# Patient Record
Sex: MALE | Race: ASIAN | URBAN - METROPOLITAN AREA
[De-identification: names, ages, dates, MRNs, and addresses within clinical notes are randomized per-mention and may not be internally consistent; named-entity substitution may affect disease eponyms.]

---

## 2019-04-01 ENCOUNTER — TELEPHONE (OUTPATIENT)
Dept: NEUROLOGY | Facility: CLINIC | Age: 39
End: 2019-04-01

## 2019-04-01 ENCOUNTER — OFFICE VISIT (OUTPATIENT)
Dept: NEUROLOGY | Facility: CLINIC | Age: 39
End: 2019-04-01

## 2019-04-01 VITALS
RESPIRATION RATE: 15 BRPM | BODY MASS INDEX: 21.97 KG/M2 | SYSTOLIC BLOOD PRESSURE: 135 MMHG | WEIGHT: 140 LBS | HEART RATE: 115 BPM | DIASTOLIC BLOOD PRESSURE: 88 MMHG | HEIGHT: 67 IN

## 2019-04-01 DIAGNOSIS — G37.3 TRANSVERSE MYELITIS (HCC): ICD-10-CM

## 2019-04-01 DIAGNOSIS — G35 MULTIPLE SCLEROSIS (HCC): Primary | ICD-10-CM

## 2019-04-01 PROCEDURE — 99203 OFFICE O/P NEW LOW 30 MIN: CPT | Performed by: OTHER

## 2019-04-01 RX ORDER — MECLIZINE HYDROCHLORIDE CHEWABLE TABLETS 25 MG/1
TABLET, CHEWABLE ORAL
Qty: 30 TABLET | Refills: 0 | Status: SHIPPED | OUTPATIENT
Start: 2019-04-01

## 2019-04-01 RX ORDER — BACLOFEN 10 MG/1
10 TABLET ORAL 2 TIMES DAILY
COMMUNITY
End: 2021-06-07

## 2019-04-01 RX ORDER — OXYBUTYNIN CHLORIDE 5 MG/1
5 TABLET ORAL 2 TIMES DAILY
Qty: 60 TABLET | Refills: 3 | Status: SHIPPED | OUTPATIENT
Start: 2019-04-01 | End: 2019-06-18

## 2019-04-01 RX ORDER — GABAPENTIN 300 MG/1
300 CAPSULE ORAL 2 TIMES DAILY
COMMUNITY
End: 2021-06-07

## 2019-04-01 RX ORDER — TADALAFIL 20 MG/1
20 TABLET ORAL
Qty: 4 TABLET | Refills: 0 | Status: SHIPPED | OUTPATIENT
Start: 2019-04-01

## 2019-04-01 NOTE — H&P
[address] [facility] with [facility]  4/1/2019    11:20 AM    Diagnosis of MS      46 RHM diagnosed 2001 with Multiple Sclerosis. He was diagnosed by Dr Bob Shah. LP done.   Steroid given and he did not ge testicles (cpt=76870)      Left testicle         Current Outpatient Medications:   •  gabapentin 300 MG Oral Cap, Take 300 mg by mouth 2 (two) times daily. , Disp: , Rfl:   •  baclofen 10 MG Oral Tab, Take 10 mg by mouth 2 (two) times daily. , Disp: , Rfl: jose alejandro.      IMPRESSION:  Probable demyelinating disease primarily spinal cord. The normal visual evoked potential indicates no evidence of optic nerve dysfunction and therefore is not in keeping with neuromyelitis optica.   He is steroid unresponsive which

## 2019-06-18 ENCOUNTER — OFFICE VISIT (OUTPATIENT)
Dept: NEUROLOGY | Facility: CLINIC | Age: 39
End: 2019-06-18

## 2019-06-18 ENCOUNTER — TELEPHONE (OUTPATIENT)
Dept: NEUROLOGY | Facility: CLINIC | Age: 39
End: 2019-06-18

## 2019-06-18 VITALS
HEART RATE: 99 BPM | SYSTOLIC BLOOD PRESSURE: 121 MMHG | DIASTOLIC BLOOD PRESSURE: 68 MMHG | HEIGHT: 67 IN | RESPIRATION RATE: 18 BRPM | WEIGHT: 150 LBS | BODY MASS INDEX: 23.54 KG/M2

## 2019-06-18 DIAGNOSIS — G35 MULTIPLE SCLEROSIS (HCC): ICD-10-CM

## 2019-06-18 DIAGNOSIS — G37.3 TRANSVERSE MYELITIS (HCC): Primary | ICD-10-CM

## 2019-06-18 PROCEDURE — 99211 OFF/OP EST MAY X REQ PHY/QHP: CPT | Performed by: OTHER

## 2019-06-18 RX ORDER — FLAVOXATE HYDROCHLORIDE 100 MG/1
100 TABLET ORAL 2 TIMES DAILY PRN
Qty: 60 TABLET | Refills: 3 | Status: SHIPPED | OUTPATIENT
Start: 2019-06-18 | End: 2020-02-11

## 2019-06-18 RX ORDER — TRAZODONE HYDROCHLORIDE 50 MG/1
50 TABLET ORAL NIGHTLY
Qty: 30 TABLET | Refills: 3 | Status: SHIPPED | OUTPATIENT
Start: 2019-06-18 | End: 2020-02-11

## 2019-06-18 NOTE — PROGRESS NOTES
[employer] with [address]  2/2/1980  Primary Care Provider:  Jose Baum MD    6/18/2019  Accompanied visit:     (x) No.        46year old yo patient being seen for:  Pa changes  No cervical or upper thoracic cord lesion      Problem/s Identified this visit:   Transverse myelitis ([facility] [address]  (primary encounter diagnosis)  Multiple sclerosis ([facility] [address]    Discussion plus Diagnostics & Treatment Orders:  Orders Placed This Encounter visits were with done with NP, PA or MA/PSRs during the entirety of the visit

## 2019-06-18 NOTE — PROGRESS NOTES
Patient was diagnosed with UTI 3 days after starting new RX prescribed by HE. Patient reports urinating blood, back pain. Went to [facility] ER. CT scan showed kidney normal.   No MS flare since last visit.

## 2019-06-18 NOTE — TELEPHONE ENCOUNTER
[address] [facility]  DOS:  05/14/2019  One disk    Uploaded disk  Returned disk to pt

## 2019-07-02 ENCOUNTER — TELEPHONE (OUTPATIENT)
Dept: NEUROLOGY | Facility: CLINIC | Age: 39
End: 2019-07-02

## 2019-07-02 ENCOUNTER — PATIENT MESSAGE (OUTPATIENT)
Dept: NEUROLOGY | Facility: CLINIC | Age: 39
End: 2019-07-02

## 2019-07-02 NOTE — TELEPHONE ENCOUNTER
Pt needs codes for labs ordered. Pt does not have insurance so calling various labs for costs. Call pt.

## 2019-07-02 NOTE — TELEPHONE ENCOUNTER
Called patient with CPT codes. Will Mychart codes to patient.     TANK direct Reflex to 9 ENAS  CPT:  04556    Rheumatoid Arthritis factor  CPT: 04548    Edilma Greenwood  CPT:  55181

## 2019-07-03 NOTE — PROGRESS NOTES
Patient since starting the trazodone has been having headaches and dizziness the following morning. Instructed him to discontinue the trazodone and see if headaches/dizziness improve. If it does not improve he was instructed to call back and let us know.  H

## 2019-07-03 NOTE — TELEPHONE ENCOUNTER
From: Jamaal Cowan RN  To: Gary Merino  Sent: 7/2/2019 11:02 AM CDT  Subject: codes    Good morning [facility],    Here are the codes you requested:    TANK direct Reflex to 9 ENAS  CPT: 00601    Rheumatoid Arthritis factor  CPT: 76426    Sed Rate, Jethro

## 2019-07-15 LAB
ANA SCREEN, IFA: NEGATIVE
RHEUMATOID FACTOR: <14 IU/ML
SED RATE BY MODIFIED$WESTERGREN: 2 MM/H

## 2019-09-05 ENCOUNTER — TELEPHONE (OUTPATIENT)
Dept: NEUROLOGY | Facility: CLINIC | Age: 39
End: 2019-09-05

## 2020-02-11 ENCOUNTER — OFFICE VISIT (OUTPATIENT)
Dept: NEUROLOGY | Facility: CLINIC | Age: 40
End: 2020-02-11
Payer: COMMERCIAL

## 2020-02-11 VITALS
DIASTOLIC BLOOD PRESSURE: 78 MMHG | RESPIRATION RATE: 16 BRPM | SYSTOLIC BLOOD PRESSURE: 119 MMHG | HEART RATE: 100 BPM | WEIGHT: 150 LBS | HEIGHT: 67 IN | BODY MASS INDEX: 23.54 KG/M2

## 2020-02-11 DIAGNOSIS — G37.3 TRANSVERSE MYELITIS (HCC): Primary | ICD-10-CM

## 2020-02-11 DIAGNOSIS — G35 MULTIPLE SCLEROSIS (HCC): ICD-10-CM

## 2020-02-11 DIAGNOSIS — R29.898 BILATERAL LEG WEAKNESS: ICD-10-CM

## 2020-02-11 DIAGNOSIS — R15.9 INCONTINENCE OF FECES, UNSPECIFIED FECAL INCONTINENCE TYPE: ICD-10-CM

## 2020-02-11 DIAGNOSIS — N39.42 URINARY INCONTINENCE WITHOUT SENSORY AWARENESS: ICD-10-CM

## 2020-02-11 DIAGNOSIS — R20.0 BILATERAL LEG NUMBNESS: ICD-10-CM

## 2020-02-11 PROCEDURE — 99213 OFFICE O/P EST LOW 20 MIN: CPT | Performed by: OTHER

## 2020-02-11 NOTE — PROGRESS NOTES
[facility] with [address]  2/2/1980  Primary Care Provider:  Elby Brittle, MD    2/11/2020  Accompanied visit:     (x) No.        45year old yo patient being seen for:  Vishal palpated    NEURO  Lower extremity shows hyperreflexia with increased tone. Virtually no movement noted. He is essentially almost totally numb from the waist down with the left leg being numb more than the right.   He cannot detect temperature and pin sen multiple sclerosis and only then can we consider putting him on disease modifying drugs. At this point treatment is mostly symptomatic. Bowel program recommended along with bladder retraining.     Referral to [facility] Therapy given   [facility]

## 2020-08-24 ENCOUNTER — OFFICE VISIT (OUTPATIENT)
Dept: NEUROLOGY | Facility: CLINIC | Age: 40
End: 2020-08-24
Payer: COMMERCIAL

## 2020-08-24 VITALS
HEIGHT: 67 IN | RESPIRATION RATE: 16 BRPM | HEART RATE: 105 BPM | SYSTOLIC BLOOD PRESSURE: 135 MMHG | DIASTOLIC BLOOD PRESSURE: 78 MMHG | BODY MASS INDEX: 23.54 KG/M2 | TEMPERATURE: 98 F | WEIGHT: 150 LBS

## 2020-08-24 DIAGNOSIS — N39.42 URINARY INCONTINENCE WITHOUT SENSORY AWARENESS: ICD-10-CM

## 2020-08-24 DIAGNOSIS — R29.898 BILATERAL LEG WEAKNESS: ICD-10-CM

## 2020-08-24 DIAGNOSIS — G37.3 TRANSVERSE MYELITIS (HCC): Primary | ICD-10-CM

## 2020-08-24 DIAGNOSIS — R20.0 BILATERAL LEG NUMBNESS: ICD-10-CM

## 2020-08-24 PROCEDURE — 3078F DIAST BP <80 MM HG: CPT | Performed by: OTHER

## 2020-08-24 PROCEDURE — 3008F BODY MASS INDEX DOCD: CPT | Performed by: OTHER

## 2020-08-24 PROCEDURE — 99213 OFFICE O/P EST LOW 20 MIN: CPT | Performed by: OTHER

## 2020-08-24 PROCEDURE — 3075F SYST BP GE 130 - 139MM HG: CPT | Performed by: OTHER

## 2020-08-24 NOTE — PROGRESS NOTES
[facility] with [address]  2/2/1980  Primary Care Provider:  Jeni Taylor MD    8/24/2020  Accompanied visit:      (x) No.        45year old yo patient being seen for:  T encounter diagnosis)  Bilateral leg numbness  Bilateral leg weakness  Urinary incontinence without sensory awareness    Discussion plus Diagnostics & Treatment Orders:  Orders Placed This Encounter      DME - External      [facility] ([facility]

## 2021-03-31 ENCOUNTER — PATIENT MESSAGE (OUTPATIENT)
Dept: NEUROLOGY | Facility: CLINIC | Age: 41
End: 2021-03-31

## 2021-03-31 NOTE — TELEPHONE ENCOUNTER
Replied via [address] with COVID-19 vaccination smart phrase and added that we have NKA listed and asked patient to please update us if there are allergies unknown to this office.

## 2021-03-31 NOTE — TELEPHONE ENCOUNTER
From: Kris Aguilar  To:  Anya De Jesus MD  Sent: 3/31/2021 3:24 PM CDT  Subject: Other    Hi Dr Celina Escudero,     Is the covid vaccine(pfizer) safe for me to get considering my health condition and what are the chances of getting complications or jose

## 2021-04-12 ENCOUNTER — TELEPHONE (OUTPATIENT)
Dept: NEUROLOGY | Facility: CLINIC | Age: 41
End: 2021-04-12

## 2021-04-12 DIAGNOSIS — G37.3 TRANSVERSE MYELITIS (HCC): Primary | ICD-10-CM

## 2021-04-12 DIAGNOSIS — R20.0 BILATERAL LEG NUMBNESS: ICD-10-CM

## 2021-04-12 DIAGNOSIS — G35 MULTIPLE SCLEROSIS (HCC): ICD-10-CM

## 2021-04-12 DIAGNOSIS — N39.42 URINARY INCONTINENCE WITHOUT SENSORY AWARENESS: ICD-10-CM

## 2021-04-12 DIAGNOSIS — R29.898 BILATERAL LEG WEAKNESS: ICD-10-CM

## 2021-04-12 NOTE — TELEPHONE ENCOUNTER
Patient called, he is requesting a referral for [facility] physical therapy in [place]. Dr. Celina Escudero wrote a referral for patient last year but he did not go due to covid. Please advise if we can create new physical therapy referral for patient.

## 2021-04-13 NOTE — TELEPHONE ENCOUNTER
Called and spoke with patient to clarify point of service. Patient would like referral to [facility] therapy in [facility] on [address]. New order placed as requested. Has appointment today.

## 2021-04-14 ENCOUNTER — TELEPHONE (OUTPATIENT)
Dept: NEUROLOGY | Facility: CLINIC | Age: 41
End: 2021-04-14

## 2021-04-14 NOTE — TELEPHONE ENCOUNTER
Physical therapy evaluation and plan of care received from  therapy for physician review and signature. Patient receives PT for signs and symptoms of abnormal gait, ataxia, difficulty walking/abnormality of gait and multiple sclerosis.     Frequency: 2

## 2021-05-20 ENCOUNTER — TELEPHONE (OUTPATIENT)
Dept: NEUROLOGY | Facility: CLINIC | Age: 41
End: 2021-05-20

## 2021-05-20 DIAGNOSIS — G35 MULTIPLE SCLEROSIS (HCC): Primary | ICD-10-CM

## 2021-05-20 NOTE — TELEPHONE ENCOUNTER
Pt needs new orders for brain and thoracic MRIs at [facility].   [facility] informed pt they cannot use the orders from last August.

## 2021-06-07 ENCOUNTER — OFFICE VISIT (OUTPATIENT)
Dept: NEUROLOGY | Facility: CLINIC | Age: 41
End: 2021-06-07
Payer: COMMERCIAL

## 2021-06-07 ENCOUNTER — TELEPHONE (OUTPATIENT)
Dept: NEUROLOGY | Facility: CLINIC | Age: 41
End: 2021-06-07

## 2021-06-07 VITALS
WEIGHT: 150 LBS | SYSTOLIC BLOOD PRESSURE: 120 MMHG | DIASTOLIC BLOOD PRESSURE: 70 MMHG | BODY MASS INDEX: 23.54 KG/M2 | RESPIRATION RATE: 16 BRPM | HEART RATE: 74 BPM | HEIGHT: 67 IN

## 2021-06-07 DIAGNOSIS — G35 MULTIPLE SCLEROSIS (HCC): Primary | ICD-10-CM

## 2021-06-07 DIAGNOSIS — G43.009 MIGRAINE WITHOUT AURA AND WITHOUT STATUS MIGRAINOSUS, NOT INTRACTABLE: ICD-10-CM

## 2021-06-07 DIAGNOSIS — G37.3 TRANSVERSE MYELITIS (HCC): ICD-10-CM

## 2021-06-07 DIAGNOSIS — R29.898 BILATERAL LEG WEAKNESS: ICD-10-CM

## 2021-06-07 DIAGNOSIS — R20.0 BILATERAL LEG NUMBNESS: ICD-10-CM

## 2021-06-07 PROCEDURE — 3074F SYST BP LT 130 MM HG: CPT | Performed by: OTHER

## 2021-06-07 PROCEDURE — 3078F DIAST BP <80 MM HG: CPT | Performed by: OTHER

## 2021-06-07 PROCEDURE — 99214 OFFICE O/P EST MOD 30 MIN: CPT | Performed by: OTHER

## 2021-06-07 PROCEDURE — 3008F BODY MASS INDEX DOCD: CPT | Performed by: OTHER

## 2021-06-07 RX ORDER — TOPIRAMATE 25 MG/1
25 TABLET ORAL 2 TIMES DAILY
Qty: 60 TABLET | Refills: 5 | Status: SHIPPED | OUTPATIENT
Start: 2021-06-07 | End: 2022-01-26

## 2021-06-07 RX ORDER — CEPHALEXIN 500 MG/1
500 CAPSULE ORAL 2 TIMES DAILY
COMMUNITY
End: 2021-06-08 | Stop reason: ALTCHOICE

## 2021-06-07 RX ORDER — GABAPENTIN 300 MG/1
300 CAPSULE ORAL 2 TIMES DAILY
Qty: 180 CAPSULE | Refills: 3 | Status: SHIPPED | OUTPATIENT
Start: 2021-06-07

## 2021-06-07 RX ORDER — PREDNISONE 20 MG/1
TABLET ORAL
Qty: 30 TABLET | Refills: 0 | Status: SHIPPED | OUTPATIENT
Start: 2021-06-12 | End: 2021-06-27

## 2021-06-07 RX ORDER — METHYLPREDNISOLONE SODIUM SUCCINATE 500 MG/1
1000 INJECTION, POWDER, FOR SOLUTION INTRAMUSCULAR; INTRAVENOUS DAILY
Status: DISCONTINUED | OUTPATIENT
Start: 2021-06-07 | End: 2021-06-07

## 2021-06-07 RX ORDER — PREDNISONE 20 MG/1
TABLET ORAL
Qty: 30 TABLET | Refills: 0 | Status: SHIPPED | OUTPATIENT
Start: 2021-06-07 | End: 2021-06-08

## 2021-06-07 RX ORDER — METHYLPREDNISOLONE SODIUM SUCCINATE 500 MG/1
500 INJECTION, POWDER, FOR SOLUTION INTRAMUSCULAR; INTRAVENOUS DAILY
Status: COMPLETED | OUTPATIENT
Start: 2021-06-10 | End: 2021-06-11

## 2021-06-07 RX ORDER — SUMATRIPTAN 100 MG/1
100 TABLET, FILM COATED ORAL EVERY 2 HOUR PRN
Qty: 9 TABLET | Refills: 5 | Status: SHIPPED | OUTPATIENT
Start: 2021-06-07 | End: 2021-11-22

## 2021-06-07 RX ORDER — BACLOFEN 10 MG/1
10 TABLET ORAL 2 TIMES DAILY
Qty: 180 TABLET | Refills: 3 | Status: SHIPPED | OUTPATIENT
Start: 2021-06-07

## 2021-06-07 RX ORDER — METHYLPREDNISOLONE SODIUM SUCCINATE 500 MG/1
1000 INJECTION, POWDER, FOR SOLUTION INTRAMUSCULAR; INTRAVENOUS DAILY
Status: COMPLETED | OUTPATIENT
Start: 2021-06-07 | End: 2021-06-09

## 2021-06-07 RX ORDER — METHYLPREDNISOLONE SODIUM SUCCINATE 500 MG/1
500 INJECTION, POWDER, FOR SOLUTION INTRAMUSCULAR; INTRAVENOUS DAILY
Status: DISCONTINUED | OUTPATIENT
Start: 2021-06-09 | End: 2021-06-07

## 2021-06-07 RX ORDER — PREDNISONE 20 MG/1
TABLET ORAL
Qty: 30 TABLET | Refills: 0 | Status: SHIPPED | OUTPATIENT
Start: 2021-06-11 | End: 2021-06-07

## 2021-06-07 RX ADMIN — METHYLPREDNISOLONE SODIUM SUCCINATE 1000 MG: 500 INJECTION, POWDER, FOR SOLUTION INTRAMUSCULAR; INTRAVENOUS at 09:12:00

## 2021-06-07 NOTE — TELEPHONE ENCOUNTER
Called [facility] [phone] and was transferred to [facility]. I spoke with Elder Mates. Codes [id number] and [id number] are valid and billable and no authorization is required.  She indicated that medical records might be needed and also that this is not a [facility]

## 2021-06-07 NOTE — PROGRESS NOTES
IV started per Clemente JACINTO RN and Solu-Medrol infused over 20 minutes without difficulty. IV discontinued per RN. Patient tolerated infusion well with no complications.

## 2021-06-07 NOTE — PROGRESS NOTES
[facility] with [address]  2/2/1980  Primary Care Provider:  Rachana Carlisle MD    6/7/2021  Accompanied visit:      (x) No.      37year old yo patient being seen for:  MS a (100 mg total) by mouth every 2 (two) hours as needed for Migraine. , Disp: 9 tablet, Rfl: 5  •  Meclizine HCl 25 MG Oral Chew Tab, 1/2 tab BID prn for dizziness, Disp: 30 tablet, Rfl: 0  •  Tadalafil (CIALIS) 20 MG Oral Tab, Take 1 tablet (20 mg total) by by mouth 2 (two) times daily. Dispense:  180 capsule          Refill:  3      baclofen 10 MG Oral Tab          Sig: Take 1 tablet (10 mg total) by mouth 2 (two) times daily.           Dispense:  180 tablet          Refill:  3      predniSONE 20 MG patient was escorted out and handed-off discharge process and instructions to the check out desk.   No additional issues relevant to visit were raised to staff at this time interval.        This document is to be interpreted as my current opinion regarding

## 2021-06-07 NOTE — TELEPHONE ENCOUNTER
PA started for Solumedrol infusion 1 gm x 3 days and 500 mg x 2 days in [place] office. Orders placed. Infusion started today with OV.     Routed to PA Team.

## 2021-06-08 ENCOUNTER — NURSE ONLY (OUTPATIENT)
Dept: NEUROLOGY | Facility: CLINIC | Age: 41
End: 2021-06-08
Payer: COMMERCIAL

## 2021-06-08 VITALS — DIASTOLIC BLOOD PRESSURE: 72 MMHG | SYSTOLIC BLOOD PRESSURE: 120 MMHG | RESPIRATION RATE: 18 BRPM | HEART RATE: 120 BPM

## 2021-06-08 DIAGNOSIS — G35 MULTIPLE SCLEROSIS (HCC): Primary | ICD-10-CM

## 2021-06-08 PROCEDURE — 3074F SYST BP LT 130 MM HG: CPT | Performed by: OTHER

## 2021-06-08 PROCEDURE — 96365 THER/PROPH/DIAG IV INF INIT: CPT | Performed by: OTHER

## 2021-06-08 PROCEDURE — 3078F DIAST BP <80 MM HG: CPT | Performed by: OTHER

## 2021-06-08 RX ADMIN — METHYLPREDNISOLONE SODIUM SUCCINATE 1000 MG: 500 INJECTION, POWDER, FOR SOLUTION INTRAMUSCULAR; INTRAVENOUS at 11:01:00

## 2021-06-08 NOTE — PROGRESS NOTES
IV started per [facility] RN into right antecubital site and Solu-Medrol infused over 20 minutes without difficulty. IV discontinued per RN. Patient tolerated infusion well with no complications.

## 2021-06-08 NOTE — TELEPHONE ENCOUNTER
During patient's solumedrol infusion today, patient requested a medication to aid in his sleep. RN spoke to the provider and he ordered Lunesta 2mg HS PRN 20 pills. RN entered ordered medication for the patient.

## 2021-06-08 NOTE — TELEPHONE ENCOUNTER
Patient is to receive only 5 days of solumedrol. 1000 mg for 3 days, 500 mg for 2 days. Steroid taper to follow.

## 2021-06-09 ENCOUNTER — NURSE ONLY (OUTPATIENT)
Dept: NEUROLOGY | Facility: CLINIC | Age: 41
End: 2021-06-09
Payer: COMMERCIAL

## 2021-06-09 VITALS
HEIGHT: 67 IN | WEIGHT: 150 LBS | OXYGEN SATURATION: 99 % | RESPIRATION RATE: 16 BRPM | BODY MASS INDEX: 23.54 KG/M2 | DIASTOLIC BLOOD PRESSURE: 78 MMHG | SYSTOLIC BLOOD PRESSURE: 136 MMHG | HEART RATE: 101 BPM

## 2021-06-09 DIAGNOSIS — G37.3 TRANSVERSE MYELITIS (HCC): ICD-10-CM

## 2021-06-09 PROCEDURE — 3078F DIAST BP <80 MM HG: CPT | Performed by: OTHER

## 2021-06-09 PROCEDURE — 3075F SYST BP GE 130 - 139MM HG: CPT | Performed by: OTHER

## 2021-06-09 PROCEDURE — 3008F BODY MASS INDEX DOCD: CPT | Performed by: OTHER

## 2021-06-09 RX ADMIN — METHYLPREDNISOLONE SODIUM SUCCINATE 1000 MG: 500 INJECTION, POWDER, FOR SOLUTION INTRAMUSCULAR; INTRAVENOUS at 10:24:00

## 2021-06-09 NOTE — PROGRESS NOTES
IV started per Oriana Weinberg RN and Solu-Medrol infused over 20 minutes without difficulty. IV discontinued per RN. Patient tolerated infusion well with no complications.       Patient made comment that he had increase stiffness with walking yesterday after infus

## 2021-06-10 ENCOUNTER — NURSE ONLY (OUTPATIENT)
Dept: NEUROLOGY | Facility: CLINIC | Age: 41
End: 2021-06-10
Payer: COMMERCIAL

## 2021-06-10 ENCOUNTER — TELEPHONE (OUTPATIENT)
Dept: NEUROLOGY | Facility: CLINIC | Age: 41
End: 2021-06-10

## 2021-06-10 VITALS — DIASTOLIC BLOOD PRESSURE: 64 MMHG | SYSTOLIC BLOOD PRESSURE: 122 MMHG | HEART RATE: 70 BPM

## 2021-06-10 DIAGNOSIS — G35 MULTIPLE SCLEROSIS (HCC): ICD-10-CM

## 2021-06-10 DIAGNOSIS — G35 MULTIPLE SCLEROSIS (HCC): Primary | ICD-10-CM

## 2021-06-10 PROCEDURE — 3074F SYST BP LT 130 MM HG: CPT | Performed by: OTHER

## 2021-06-10 PROCEDURE — 96365 THER/PROPH/DIAG IV INF INIT: CPT | Performed by: OTHER

## 2021-06-10 PROCEDURE — 3078F DIAST BP <80 MM HG: CPT | Performed by: OTHER

## 2021-06-10 RX ORDER — FAMOTIDINE 20 MG/1
20 TABLET ORAL 2 TIMES DAILY
Qty: 60 TABLET | Refills: 0 | Status: SHIPPED | OUTPATIENT
Start: 2021-06-10 | End: 2021-11-22

## 2021-06-10 RX ORDER — ETOH/EUC OIL/MENTH/PEP/WINTERG
SPRAY, NON-AEROSOL (ML) MUCOUS MEMBRANE
Qty: 20 EACH | Refills: 2 | Status: SHIPPED
Start: 2021-06-10

## 2021-06-10 RX ORDER — DRAINAGE BAG
EACH MISCELLANEOUS
Qty: 4 EACH | Refills: 5 | Status: SHIPPED
Start: 2021-06-10

## 2021-06-10 RX ORDER — DIAPER,BRIEF,ADULT, DISPOSABLE
EACH MISCELLANEOUS
Qty: 25 EACH | Refills: 2 | Status: SHIPPED
Start: 2021-06-10

## 2021-06-10 RX ADMIN — METHYLPREDNISOLONE SODIUM SUCCINATE 500 MG: 500 INJECTION, POWDER, FOR SOLUTION INTRAMUSCULAR; INTRAVENOUS at 10:29:00

## 2021-06-10 NOTE — PROGRESS NOTES
IV started per Christian Burton RN into left antecubital site and Solu-Medrol infused over 20 minutes without difficulty. IV discontinued per RN. Patient tolerated infusion well with no complications.

## 2021-06-10 NOTE — TELEPHONE ENCOUNTER
Order [facility]abil[facility]. Hilaria Gallardo also given to patient to present at next PT visit.     Patient to present to office for final solumedrol infusion tomorrow at 9 AM. Appointment scheduled as requeste

## 2021-06-11 ENCOUNTER — NURSE ONLY (OUTPATIENT)
Dept: NEUROLOGY | Facility: CLINIC | Age: 41
End: 2021-06-11
Payer: COMMERCIAL

## 2021-06-11 VITALS
OXYGEN SATURATION: 99 % | RESPIRATION RATE: 18 BRPM | HEIGHT: 67 IN | SYSTOLIC BLOOD PRESSURE: 120 MMHG | BODY MASS INDEX: 23.54 KG/M2 | WEIGHT: 150 LBS | DIASTOLIC BLOOD PRESSURE: 68 MMHG | HEART RATE: 104 BPM

## 2021-06-11 PROCEDURE — 96365 THER/PROPH/DIAG IV INF INIT: CPT | Performed by: OTHER

## 2021-06-11 PROCEDURE — 3074F SYST BP LT 130 MM HG: CPT | Performed by: OTHER

## 2021-06-11 PROCEDURE — 3008F BODY MASS INDEX DOCD: CPT | Performed by: OTHER

## 2021-06-11 PROCEDURE — 3078F DIAST BP <80 MM HG: CPT | Performed by: OTHER

## 2021-06-11 RX ADMIN — METHYLPREDNISOLONE SODIUM SUCCINATE 500 MG: 500 INJECTION, POWDER, FOR SOLUTION INTRAMUSCULAR; INTRAVENOUS at 09:49:00

## 2021-06-11 NOTE — PROGRESS NOTES
IV started per José Miguel Cordova RN and Solu-Medrol infused over 20 minutes without difficulty. IV discontinued per RN. Patient tolerated infusion well with no complications.       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

## 2021-06-23 ENCOUNTER — TELEPHONE (OUTPATIENT)
Dept: NEUROLOGY | Facility: CLINIC | Age: 41
End: 2021-06-23

## 2021-06-23 NOTE — TELEPHONE ENCOUNTER
Progress note dated 6/10/21 from [facility] PT.   Plan of Treatment is 2-3x a week for 8 weeks to address poin, joint mobility, posture deficits, gait ability, transfer ability, strength deficits, ROM, improper body mechanics, balance defecits, bed mobility, stair

## 2021-06-23 NOTE — TELEPHONE ENCOUNTER
Received [facility] Therapy progress note for signature and plan of treatment    Placed in nurses bin for completion.

## 2021-07-07 ENCOUNTER — TELEPHONE (OUTPATIENT)
Dept: NEUROLOGY | Facility: CLINIC | Age: 41
End: 2021-07-07

## 2021-07-07 NOTE — TELEPHONE ENCOUNTER
Received order for Ankle Food Orthosis Dynamic from [facility], as physical therapist has recommended. Order signed by Dr Geovanni Vigil. Faxed to [phone] with confirmation fax.

## 2021-07-16 ENCOUNTER — TELEPHONE (OUTPATIENT)
Dept: NEUROLOGY | Facility: CLINIC | Age: 41
End: 2021-07-16

## 2021-07-16 NOTE — TELEPHONE ENCOUNTER
Pt has a high deductible so he is working with Lasha Ryan for assistance with ankle brace and motorized scooter. Requesting copy of ankle brace and motorized scooter orders to [email or web address]. He also requested proof of MS diagnosis.   Suggested h

## 2021-07-26 ENCOUNTER — OFFICE VISIT (OUTPATIENT)
Dept: NEUROLOGY | Facility: CLINIC | Age: 41
End: 2021-07-26
Payer: COMMERCIAL

## 2021-07-26 VITALS — SYSTOLIC BLOOD PRESSURE: 124 MMHG | RESPIRATION RATE: 16 BRPM | DIASTOLIC BLOOD PRESSURE: 70 MMHG | HEART RATE: 100 BPM

## 2021-07-26 DIAGNOSIS — G35 MS (MULTIPLE SCLEROSIS) (HCC): Primary | ICD-10-CM

## 2021-07-26 DIAGNOSIS — G43.009 MIGRAINE WITHOUT AURA AND WITHOUT STATUS MIGRAINOSUS, NOT INTRACTABLE: ICD-10-CM

## 2021-07-26 PROCEDURE — 99214 OFFICE O/P EST MOD 30 MIN: CPT | Performed by: PHYSICIAN ASSISTANT

## 2021-07-26 PROCEDURE — 3074F SYST BP LT 130 MM HG: CPT | Performed by: PHYSICIAN ASSISTANT

## 2021-07-26 PROCEDURE — 3078F DIAST BP <80 MM HG: CPT | Performed by: PHYSICIAN ASSISTANT

## 2021-07-26 NOTE — PROGRESS NOTES
[facility] with [address]  2/2/1980  Primary Care Provider:  Jessika Loredo MD    7/26/2021  Accompanied visit: No  Previous visit and existing record notes reviewed in prep present on prior examination while remaining 2 others, one in the right frontal subcortical white matter and another in the right centrum semiovale were not clearly present on prior examination which may be due to differences in technique.  No these enhance BRIEFS MEDIUM) Does not apply Misc, Please provide briefs and under pads, Disp: 25 each, Rfl: 2  •  Incontinence Supply Disposable (UNDERPADS EXTRA LARGE) Does not apply Misc, Please provide incontinence supplies, Disp: 20 each, Rfl: 2  •  gabapentin 300 M DMTs at length with patient. Patient given information on zeposia and tysabri. We also discussed Aubagio. JCV test ordered. Regarding the zeposia recommended he discuss with the ophthalmologist. He expressed full understanding. Patient will call back with a

## 2021-09-16 ENCOUNTER — TELEPHONE (OUTPATIENT)
Dept: NEUROLOGY | Facility: CLINIC | Age: 41
End: 2021-09-16

## 2021-09-16 NOTE — TELEPHONE ENCOUNTER
Fax received from [facility] for provider review and signature. Patient is being discharged from PT. Patient received therapy for signs/symptoms consistent with abnormal gait, ataxia and difficulty walking.     Patient has reached maximum benefit from

## 2021-11-22 ENCOUNTER — TELEPHONE (OUTPATIENT)
Dept: NEUROLOGY | Facility: CLINIC | Age: 41
End: 2021-11-22

## 2021-11-22 ENCOUNTER — OFFICE VISIT (OUTPATIENT)
Dept: NEUROLOGY | Facility: CLINIC | Age: 41
End: 2021-11-22
Payer: COMMERCIAL

## 2021-11-22 VITALS
SYSTOLIC BLOOD PRESSURE: 130 MMHG | BODY MASS INDEX: 23 KG/M2 | HEIGHT: 67 IN | DIASTOLIC BLOOD PRESSURE: 77 MMHG | HEART RATE: 103 BPM

## 2021-11-22 DIAGNOSIS — G35 MS (MULTIPLE SCLEROSIS) (HCC): Primary | ICD-10-CM

## 2021-11-22 DIAGNOSIS — G43.009 MIGRAINE WITHOUT AURA AND WITHOUT STATUS MIGRAINOSUS, NOT INTRACTABLE: ICD-10-CM

## 2021-11-22 PROCEDURE — 3008F BODY MASS INDEX DOCD: CPT | Performed by: PHYSICIAN ASSISTANT

## 2021-11-22 PROCEDURE — 3075F SYST BP GE 130 - 139MM HG: CPT | Performed by: PHYSICIAN ASSISTANT

## 2021-11-22 PROCEDURE — 99214 OFFICE O/P EST MOD 30 MIN: CPT | Performed by: PHYSICIAN ASSISTANT

## 2021-11-22 PROCEDURE — 3078F DIAST BP <80 MM HG: CPT | Performed by: PHYSICIAN ASSISTANT

## 2021-11-22 RX ORDER — ELETRIPTAN HYDROBROMIDE 40 MG/1
TABLET, FILM COATED ORAL
Qty: 8 TABLET | Refills: 2 | Status: SHIPPED | OUTPATIENT
Start: 2021-11-22

## 2021-11-22 NOTE — PROGRESS NOTES
[facility] with [address]  2/2/1980  Primary Care Provider:  Mara Topete MD    11/22/2021  Accompanied visit: No  Previous visit and existing record notes reviewed in pre were not clearly present on prior examination which may be due to differences in technique. No these enhances on the postcontrast sequences. These are nonspecific.      No abnormal signal intensity foci in the corpus callosum, brainstem, cerebral peduncle, each, Rfl: 2  •  gabapentin 300 MG Oral Cap, Take 1 capsule (300 mg total) by mouth 2 (two) times daily. , Disp: 180 capsule, Rfl: 3  •  baclofen 10 MG Oral Tab, Take 1 tablet (10 mg total) by mouth 2 (two) times daily. , Disp: 180 tablet, Rfl: 3  •  topiram effects of any treatment relevant to above. Includes explanation of tests as necessary. Return in about 4 months (around 3/22/2022).       Patient understands that if needed, based on condition and or test results, follow up will be readjusted    Dior Selby

## 2021-11-22 NOTE — TELEPHONE ENCOUNTER
During office visit on 11/22/21 completed Disability ID Card and Parking Placard Renewal Forms. Gave pt original copy.   Copy to scanning

## 2021-12-13 ENCOUNTER — TELEPHONE (OUTPATIENT)
Dept: NEUROLOGY | Facility: CLINIC | Age: 41
End: 2021-12-13

## 2021-12-13 NOTE — TELEPHONE ENCOUNTER
Left voice-mail message for patient to call back to discuss negative jcv test and consider proceeding with Tysabri.

## 2021-12-17 NOTE — TELEPHONE ENCOUNTER
Pt returned call, provider not in the office today. Relayed Bessy's message to pt. He is requesting she call him on Monday to discuss Tysabri.

## 2021-12-20 NOTE — TELEPHONE ENCOUNTER
Pt concerned what his financial responsibility will be for tysabri infusions. His ins plan charged him a $75 copay for solumedrol treatments because they were done in the office. Advised once we obtain PA for tysabri, we will determine facility options for him. The MS Touch program can assist in determining pt's out-of-pocket responsibility.

## 2021-12-20 NOTE — TELEPHONE ENCOUNTER
RN called the patient and informed him we have the signed form from a previous visit. RN informed the patient we would be working on the PA and inform him when we had a decision. RN confirmed the patient will have the same insurance in 2022.

## 2021-12-21 NOTE — TELEPHONE ENCOUNTER
Start up paperwork for [place] initiated. Patient to infuse at [facility]. Will fax to [employer] when completed by provider. Patient is JCV negative.

## 2021-12-22 NOTE — TELEPHONE ENCOUNTER
Start up paperwork signed by provider. Faxed to [employer] to enroll in the REMS program. Paperwork faxed to [facility] infusion [place] for referral for infusions. Authorization pending with insurance.

## 2022-01-26 RX ORDER — TOPIRAMATE 25 MG/1
TABLET ORAL
Qty: 60 TABLET | Refills: 5 | Status: SHIPPED | OUTPATIENT
Start: 2022-01-26

## 2022-01-26 NOTE — TELEPHONE ENCOUNTER
Medication: Topiramate  25 mg     Date of last refill: 06/07/21 with 5 addt refills  [place]      Last office visit: 11/22/21  Due back to clinic per last office note:  RTN in 4 months  Date next office visit scheduled:

## 2022-03-21 ENCOUNTER — TELEPHONE (OUTPATIENT)
Dept: NEUROLOGY | Facility: CLINIC | Age: 42
End: 2022-03-21

## 2022-03-21 ENCOUNTER — OFFICE VISIT (OUTPATIENT)
Dept: NEUROLOGY | Facility: CLINIC | Age: 42
End: 2022-03-21
Payer: COMMERCIAL

## 2022-03-21 VITALS
HEART RATE: 88 BPM | RESPIRATION RATE: 16 BRPM | BODY MASS INDEX: 23 KG/M2 | HEIGHT: 67 IN | SYSTOLIC BLOOD PRESSURE: 130 MMHG | DIASTOLIC BLOOD PRESSURE: 78 MMHG

## 2022-03-21 DIAGNOSIS — G43.009 MIGRAINE WITHOUT AURA AND WITHOUT STATUS MIGRAINOSUS, NOT INTRACTABLE: ICD-10-CM

## 2022-03-21 DIAGNOSIS — G37.3 TRANSVERSE MYELITIS (HCC): ICD-10-CM

## 2022-03-21 DIAGNOSIS — G35 MS (MULTIPLE SCLEROSIS) (HCC): Primary | ICD-10-CM

## 2022-03-21 PROCEDURE — 99214 OFFICE O/P EST MOD 30 MIN: CPT | Performed by: PHYSICIAN ASSISTANT

## 2022-03-21 PROCEDURE — 3008F BODY MASS INDEX DOCD: CPT | Performed by: PHYSICIAN ASSISTANT

## 2022-03-21 PROCEDURE — 3075F SYST BP GE 130 - 139MM HG: CPT | Performed by: PHYSICIAN ASSISTANT

## 2022-03-21 PROCEDURE — 3078F DIAST BP <80 MM HG: CPT | Performed by: PHYSICIAN ASSISTANT

## 2022-03-21 NOTE — TELEPHONE ENCOUNTER
Patient initiated conversation at checkout, expressed hesitation regarding starting new medication, is unsure due to imaging results and is worried about side effects. Encouraged patient to digest OV conversation and read AVS and call office with further questions. Upon helping patient through exit door, patient expressed interest in getting second opinion, likes and respects this provider, but is hesitant. Informed that provider does want patient to feel comfortable about treatment plan and does encourage his patient's to advocate for themselves. Informed patient that this RN would follow up referral information. Informed patient of referral order. Patient states his biggest concern is the atypical presentation of MS and whether that is what this is, so would like a second opinion. Orders sent to patient via [address]   Requested that patient contact this office with any questions or should he change his mind about LP

## 2022-03-21 NOTE — TELEPHONE ENCOUNTER
Patient seen in office today. Is hesitant to start Tysabri at this time. Aubagio was discussed, and patient signed [employer] Form. Start form completed. Spoke with Shelli at [facility] and initiated PA over the phone. Received APPROVAL for Aubagio 14mg tab. Effective from 3/21/22 through 3/21/23. Reference #: [id number]    Paperwork to be held pending patient's decision to start medication vs. Seek second opinion.

## 2022-04-23 LAB
ABSOLUTE BASOPHILS: 48 CELLS/UL (ref 0–200)
ABSOLUTE EOSINOPHILS: 150 CELLS/UL (ref 15–500)
ABSOLUTE LYMPHOCYTES: 1126 CELLS/UL (ref 850–3900)
ABSOLUTE MONOCYTES: 290 CELLS/UL (ref 200–950)
ABSOLUTE NEUTROPHILS: 2785 CELLS/UL (ref 1500–7800)
ALBUMIN/GLOBULIN RATIO: 1.5 (CALC) (ref 1–2.5)
ALBUMIN: 4.3 G/DL (ref 3.6–5.1)
ALKALINE PHOSPHATASE: 113 U/L (ref 36–130)
ALT: 68 U/L (ref 9–46)
AST: 20 U/L (ref 10–40)
BASOPHILS: 1.1 %
BILIRUBIN, TOTAL: 0.5 MG/DL (ref 0.2–1.2)
BUN: 15 MG/DL (ref 7–25)
CALCIUM: 9.5 MG/DL (ref 8.6–10.3)
CARBON DIOXIDE: 26 MMOL/L (ref 20–32)
CHLORIDE: 107 MMOL/L (ref 98–110)
CREATININE: 0.85 MG/DL (ref 0.6–1.35)
EGFR IF AFRICN AM: 125 ML/MIN/1.73M2
EGFR IF NONAFRICN AM: 108 ML/MIN/1.73M2
EOSINOPHILS: 3.4 %
GLOBULIN: 2.8 G/DL (CALC) (ref 1.9–3.7)
GLUCOSE: 83 MG/DL (ref 65–99)
HEMATOCRIT: 44.5 % (ref 38.5–50)
HEMOGLOBIN: 13.8 G/DL (ref 13.2–17.1)
LYMPHOCYTES: 25.6 %
MCH: 21 PG (ref 27–33)
MCHC: 31 G/DL (ref 32–36)
MCV: 67.7 FL (ref 80–100)
MITOGEN-NIL: >10 IU/ML
MONOCYTES: 6.6 %
MPV: 10.2 FL (ref 7.5–12.5)
NEUTROPHILS: 63.3 %
NIL: 0.21 IU/ML
PLATELET COUNT: 336 THOUSAND/UL (ref 140–400)
POTASSIUM: 3.9 MMOL/L (ref 3.5–5.3)
PROTEIN, TOTAL: 7.1 G/DL (ref 6.1–8.1)
QUANTIFERON(R)-TB GOLD PLUS, 1 TUBE: POSITIVE
RDW: 17.3 % (ref 11–15)
RED BLOOD CELL COUNT: 6.57 MILLION/UL (ref 4.2–5.8)
SODIUM: 141 MMOL/L (ref 135–146)
TB1-NIL: 1.83 IU/ML
TB2-NIL: 2.1 IU/ML
WHITE BLOOD CELL COUNT: 4.4 THOUSAND/UL (ref 3.8–10.8)

## 2022-04-25 ENCOUNTER — TELEPHONE (OUTPATIENT)
Dept: NEUROLOGY | Facility: CLINIC | Age: 42
End: 2022-04-25

## 2022-04-26 ENCOUNTER — TELEPHONE (OUTPATIENT)
Dept: NEUROLOGY | Facility: CLINIC | Age: 42
End: 2022-04-26

## 2022-05-04 ENCOUNTER — TELEPHONE (OUTPATIENT)
Dept: NEUROLOGY | Facility: CLINIC | Age: 42
End: 2022-05-04

## 2022-05-04 NOTE — TELEPHONE ENCOUNTER
Patient calling, advised that he got negative results on chest x-ray at [facility] in the mail. We are waiting for results to be sent by fax. Confirmed fax number. Patient is calling to verify if he still needs treatment given the negative x-ray. Please advise when records are received.

## 2022-05-09 NOTE — TELEPHONE ENCOUNTER
Left voice-mail message for patient informing him that chest x-ray was negative and we can proceed with Aubagio. Patient instructed to call back with any questions.

## 2022-06-01 LAB
ALBUMIN/GLOBULIN RATIO: 1.6 (CALC) (ref 1–2.5)
ALBUMIN: 4.6 G/DL (ref 3.6–5.1)
ALKALINE PHOSPHATASE: 59 U/L (ref 36–130)
ALT: 14 U/L (ref 9–46)
AST: 15 U/L (ref 10–40)
BILIRUBIN, TOTAL: 0.8 MG/DL (ref 0.2–1.2)
BUN: 16 MG/DL (ref 7–25)
CALCIUM: 9.6 MG/DL (ref 8.6–10.3)
CARBON DIOXIDE: 28 MMOL/L (ref 20–32)
CHLORIDE: 106 MMOL/L (ref 98–110)
CREATININE: 1.08 MG/DL (ref 0.6–1.35)
EGFR IF AFRICN AM: 98 ML/MIN/1.73M2
EGFR IF NONAFRICN AM: 85 ML/MIN/1.73M2
GLOBULIN: 2.8 G/DL (CALC) (ref 1.9–3.7)
GLUCOSE: 83 MG/DL (ref 65–99)
POTASSIUM: 4.1 MMOL/L (ref 3.5–5.3)
PROTEIN, TOTAL: 7.4 G/DL (ref 6.1–8.1)
SODIUM: 141 MMOL/L (ref 135–146)

## 2022-06-29 RX ORDER — GABAPENTIN 300 MG/1
300 CAPSULE ORAL 2 TIMES DAILY
Qty: 180 CAPSULE | Refills: 1 | Status: SHIPPED | OUTPATIENT
Start: 2022-06-29

## 2022-06-29 RX ORDER — GABAPENTIN 300 MG/1
CAPSULE ORAL
Qty: 180 CAPSULE | Refills: 0 | OUTPATIENT
Start: 2022-06-29

## 2022-06-29 RX ORDER — BACLOFEN 10 MG/1
10 TABLET ORAL 2 TIMES DAILY
Qty: 180 TABLET | Refills: 1 | Status: SHIPPED | OUTPATIENT
Start: 2022-06-29

## 2022-06-29 RX ORDER — BACLOFEN 10 MG/1
TABLET ORAL
Qty: 180 TABLET | Refills: 0 | OUTPATIENT
Start: 2022-06-29

## 2022-07-06 NOTE — TELEPHONE ENCOUNTER
Start up paperwork faxed to [employer] to initiate treatment and enroll patient for Aukamleshgio. Receipt confirmation received. Copy sent to scanning. Labs and prior authorization have been obtained.

## 2022-08-01 ENCOUNTER — TELEPHONE (OUTPATIENT)
Dept: NEUROLOGY | Facility: CLINIC | Age: 42
End: 2022-08-01

## 2022-08-01 NOTE — TELEPHONE ENCOUNTER
Informed patient that he may take OTC medication for migraine, but may want to avoid medications that can be hard on the liver such as TYLENOL. Patient informed he may also speak to pharmacy regarding medication contraindications and advice. Also asking if he may get COVID-19 booster vaccination while on AUBAGIO and told yes, we encourage the vaccinations and no issue while on AUBAGIO. Verbalized understanding.

## 2022-08-03 RX ORDER — TOPIRAMATE 25 MG/1
TABLET ORAL
Qty: 60 TABLET | Refills: 5 | Status: SHIPPED | OUTPATIENT
Start: 2022-08-03

## 2022-08-10 ENCOUNTER — TELEPHONE (OUTPATIENT)
Dept: NEUROLOGY | Facility: CLINIC | Age: 42
End: 2022-08-10

## 2022-08-10 NOTE — TELEPHONE ENCOUNTER
After speaking with representative Subha from [facility] who said that patient may call [employer], that there are other resources he may qualify for. Verbalized understanding.

## 2022-08-10 NOTE — TELEPHONE ENCOUNTER
Informed patient that a call has been placed to our drug representative to see if patient might quality for co-pay assistance and that this office will contact him with any further information. Verbalized understanding.

## 2022-08-10 NOTE — TELEPHONE ENCOUNTER
Pt stated he started aubagio and it cost $8000 for the month. Pt concerned with costs and asking for any assistance to reduce cost.s    Call pt to advise.

## 2022-09-12 DIAGNOSIS — G35 MS (MULTIPLE SCLEROSIS) (HCC): Primary | ICD-10-CM

## 2022-09-12 NOTE — TELEPHONE ENCOUNTER
RN received paperwork from [place] [id number] [employer] requesting paper hand signed script for 6 months. Faxed to [place] [address] [employer] at [phone].

## 2022-09-28 ENCOUNTER — TELEPHONE (OUTPATIENT)
Dept: NEUROLOGY | Facility: CLINIC | Age: 42
End: 2022-09-28

## 2022-09-28 NOTE — TELEPHONE ENCOUNTER
S incontinent of bowel and bladder       B Started AUBAGIO 7/22, symptoms started one month later      A Is wearing depends day and night, is distressed and feels he cannot leave the house. Has urgency of bowel and bladder, even after elimination. Has no control. Has numbness of perineum. Denies other symptoms of flare. Denies other new meds or med changes. R Routed to provider.

## 2022-10-04 NOTE — TELEPHONE ENCOUNTER
RN called the patient to assess his current situation. Patient has been numb from the waist down for the last 6-7 weeks specifically in the perineum area, Intermittent numb and tingling in left fingers. Patient has increased stress. Patient believes it is side effect of Aubagio. RN explained it might be more likely to MS Flare or disease progression. RN advised we would forward the call to Dr. Hunter Armstrong with high priority.

## 2022-10-12 NOTE — TELEPHONE ENCOUNTER
Tried calling again -- unsuccessful   Please call the patient and agree it is not [place] but perhaps MS flare and recommend Solumedrol

## 2022-10-13 NOTE — TELEPHONE ENCOUNTER
RN called and LM for the patient with the above message. Advised we can set up Solumedrol. RN called the patient and advised of the above. 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 RN informed provider. Per provider we can offer IVIG or PLEX.

## 2022-10-14 NOTE — TELEPHONE ENCOUNTER
Patient states that for the last 2 months he has been having lack of sensation with bowl movements and feelings that he has not completely evacuated. No diarrhea. They are normal bowel movements. Spoke to patient and he is agreeable to doing the IVIG. Recommended he also consult GI. He expressed full understanding.       Please start the process for the IVIG

## 2022-10-17 NOTE — TELEPHONE ENCOUNTER
Patient returned call, would like to infuse @ [facility] in [place]. [facility] IVIG orders printed to complete. Packet placed in nurses bin.

## 2022-10-18 NOTE — TELEPHONE ENCOUNTER
Discussed with provider. Patient to receive 5 consecutive days of IVIG for treatment of this flare. Referral order placed and faxed to [facility] infusion [place] with receipt confirmation. Orders faxed to [phone]. Paperwork placed in nurses bin awaiting authorization from infusion center.

## 2022-10-24 ENCOUNTER — TELEPHONE (OUTPATIENT)
Dept: NEUROLOGY | Facility: CLINIC | Age: 42
End: 2022-10-24

## 2022-10-24 ENCOUNTER — OFFICE VISIT (OUTPATIENT)
Dept: NEUROLOGY | Facility: CLINIC | Age: 42
End: 2022-10-24
Payer: COMMERCIAL

## 2022-10-24 VITALS
RESPIRATION RATE: 16 BRPM | HEIGHT: 67 IN | SYSTOLIC BLOOD PRESSURE: 119 MMHG | WEIGHT: 140 LBS | DIASTOLIC BLOOD PRESSURE: 75 MMHG | BODY MASS INDEX: 21.97 KG/M2 | HEART RATE: 90 BPM

## 2022-10-24 DIAGNOSIS — R20.0 PERINEAL NUMBNESS: ICD-10-CM

## 2022-10-24 DIAGNOSIS — R19.4 FREQUENT BOWEL MOVEMENTS: ICD-10-CM

## 2022-10-24 DIAGNOSIS — G35 MS (MULTIPLE SCLEROSIS) (HCC): Primary | ICD-10-CM

## 2022-10-24 PROCEDURE — 3008F BODY MASS INDEX DOCD: CPT | Performed by: PHYSICIAN ASSISTANT

## 2022-10-24 PROCEDURE — 3074F SYST BP LT 130 MM HG: CPT | Performed by: PHYSICIAN ASSISTANT

## 2022-10-24 PROCEDURE — 3078F DIAST BP <80 MM HG: CPT | Performed by: PHYSICIAN ASSISTANT

## 2022-10-24 PROCEDURE — 99214 OFFICE O/P EST MOD 30 MIN: CPT | Performed by: PHYSICIAN ASSISTANT

## 2022-10-24 RX ORDER — RENAGEL 800 MG/1
1 TABLET ORAL DAILY
COMMUNITY

## 2022-10-24 NOTE — TELEPHONE ENCOUNTER
Spoke with Vasile Post @ [facility] who confirmed orders received on 10/19/2022. They provided PA information as requested by insurance company. Insurance company to do a courtesy review. Spoke with Phil Rico @ [facility] who will follow up with insurance company regarding IVIG authorization. Patient informed, provided with contact information for Phil Rico @ [facility] ([phone]).

## 2022-10-30 LAB
ALBUMIN/GLOBULIN RATIO: 1.4 (CALC) (ref 1–2.5)
ALBUMIN: 4.3 G/DL (ref 3.6–5.1)
ALKALINE PHOSPHATASE: 68 U/L (ref 36–130)
ALT: 27 U/L (ref 9–46)
AST: 20 U/L (ref 10–40)
BILIRUBIN, TOTAL: 0.7 MG/DL (ref 0.2–1.2)
BUN: 15 MG/DL (ref 7–25)
CALCIUM: 9.4 MG/DL (ref 8.6–10.3)
CARBON DIOXIDE: 28 MMOL/L (ref 20–32)
CHLORIDE: 107 MMOL/L (ref 98–110)
CREATININE: 1.01 MG/DL (ref 0.6–1.29)
EGFR: 96 ML/MIN/1.73M2
GLOBULIN: 3.1 G/DL (CALC) (ref 1.9–3.7)
GLUCOSE: 88 MG/DL (ref 65–99)
POTASSIUM: 3.8 MMOL/L (ref 3.5–5.3)
PROTEIN, TOTAL: 7.4 G/DL (ref 6.1–8.1)
SODIUM: 142 MMOL/L (ref 135–146)

## 2022-10-31 ENCOUNTER — TELEPHONE (OUTPATIENT)
Dept: NEUROLOGY | Facility: CLINIC | Age: 42
End: 2022-10-31

## 2022-10-31 NOTE — TELEPHONE ENCOUNTER
Per patient he is having less issues with accidents going to the bathroom. Patient would like to discuss Eliminate the Aubagio from his system. RN advised she would send this to the provider for recommendation. Per patient he called [facility] but has not heard back. RN will call [facility] for an update.

## 2022-11-02 ENCOUNTER — TELEPHONE (OUTPATIENT)
Dept: NEUROLOGY | Facility: CLINIC | Age: 42
End: 2022-11-02

## 2022-11-02 PROBLEM — H35.719 CENTRAL SEROUS CHORIORETINOPATHY: Status: ACTIVE | Noted: 2022-11-02

## 2022-11-02 PROBLEM — H35.719 CENTRAL SEROUS RETINOPATHY: Status: ACTIVE | Noted: 2022-11-02

## 2022-11-02 NOTE — TELEPHONE ENCOUNTER
Received phone call from [facility] requesting assistance obtaining authorization for patient use of IVIG for treatment of MS Flare. Patient is unable to use steroids d/t the medical condition: central serous retinopathy    Requested letter from our office stating the above and if patient has tried and failed interferon therapy. Letter drafted and faxed to [facility] @ [phone] with receipt confirmation, attn: Fran Bullock authorization.

## 2022-11-03 ENCOUNTER — PATIENT MESSAGE (OUTPATIENT)
Dept: NEUROLOGY | Facility: CLINIC | Age: 42
End: 2022-11-03

## 2022-11-04 NOTE — TELEPHONE ENCOUNTER
From: ERICK Brody  To: Rigobertodasia Parsons  Sent: 11/3/2022 4:26 PM CDT  Subject: CBC      Yash,     The only cbc I was able to find at [address] [place] [address] was done April 2022. There was an order to do one in October 2022 from me. I was not able to find results for that one. Did you have it drawn? If you have a copy of the results, please send us a copy.       Thank you,    SVEN BrodyC

## 2022-11-14 NOTE — TELEPHONE ENCOUNTER
RN spoke to Bernardo MARTÍNEZ at [employer] and was informed they are to call [facility] back on Nov. 16th. Per [facility] this is a courtesy review. They confirmed they received the paperwork on Nov. 2nd and have 7-10 days to review the case. Argelia Valladares Cell [phone].

## 2022-11-20 ENCOUNTER — TELEPHONE (OUTPATIENT)
Dept: NEUROLOGY | Facility: CLINIC | Age: 42
End: 2022-11-20

## 2022-11-20 NOTE — TELEPHONE ENCOUNTER
Spoke to patient on 11/17/22 and recommended he seek evaluation in the ER for MS flare-up to be admitted for treatment. He expressed full understanding.

## 2022-11-23 NOTE — TELEPHONE ENCOUNTER
Fax received from [facility] that patient use of Gammagard treatment has been approved. They will reach out to patient to schedule the infusions.

## 2022-11-30 ENCOUNTER — TELEPHONE (OUTPATIENT)
Dept: NEUROLOGY | Facility: CLINIC | Age: 42
End: 2022-11-30

## 2022-11-30 NOTE — TELEPHONE ENCOUNTER
Per Dr Marie Weller, patient may receive Saline Bolus and to slow down infusion. Caty at [facility] verbalized understanding and will proceed.

## 2022-11-30 NOTE — TELEPHONE ENCOUNTER
Caty with [facility] calling -- advised that patient is having a headache while receiving IVIG. Wants to know if we can give patient fluid saline bolus? Please advise.

## 2022-12-01 ENCOUNTER — TELEPHONE (OUTPATIENT)
Dept: NEUROLOGY | Facility: CLINIC | Age: 42
End: 2022-12-01

## 2022-12-01 NOTE — TELEPHONE ENCOUNTER
Received call from Lien at [facility] requesting pre-medication orders prior to patient IVIG infusion. Patient with continued complaints of headache, refusing to take tylenol at this time. Discussed with provider and infusion center, will send over orders for pre-medications. Acetaminophen 650 mg orally  Benadryl 25 mg- 50 mg orally  IV Bolus of 500cc of NS    Orders signed and faxed with receipt confirmation.

## 2022-12-01 NOTE — TELEPHONE ENCOUNTER
Received a call from cinthya from [employer]. Notes that he is now having muscle and joint pain, feeling as if he may have had a fever last night, but current temp is 98.2. Currently on day 4/5 of infusion. Please advise.

## 2022-12-05 NOTE — TELEPHONE ENCOUNTER
Acute Migraine assessment:    Is this your typical migraine? Describe any change in character from past migraines. Yes:     Location of Pain (select all that apply):  temple and top of head  # Days pain has been present:  5    Describe the pain:  Throbbing  Intensity of the headaches:    With medication: MODERATE   Without medication MODERATE  Associated Symptoms (check all that apply):  Nausea/Upset Stomach, Sensitivity to light, Sensitivity to sound, Sensitivity to smell, Dizziness, Lightheadedness, Runny or stuffy nose, Difficulty concentrating and Mood changes/irritability   Non-pharmaceutical interventions tried and outcome:   [x] Lying down / sleeping:  WORSE  [x] Being in a dark quiet room:  NO CHANGE  [] Massage your head: not tried  [] Cold pack on your head/neck:  not tried  [] Hot pack on your head/neck:  not tried  [] Other:     Abortive Medications tried:  EXCEDRIN (helped little)  Current preventative medication list:  TOPIRAMATE  Any missed doses? No  Any other relevant information:  Patient has not picked up ELETRIPTAN Rx (11/22), as he has not had many migraines with TOPIRAMATE. Adivsed to  and try ELETRIPTAN and get back to us as to whether it helped. Any medications contraindicated? Tried and failed? No      Willing to try Medrol Dosepak? No  Last taken:  States he cannot tolerate   Willing to try Toradol/Decadron injections? No  Last taken:  States he cannot tolerate steroids  Advised patient to seek immediate medical treatment in ED for any concerning symptoms or changes to condition.

## 2022-12-06 ENCOUNTER — TELEPHONE (OUTPATIENT)
Dept: NEUROLOGY | Facility: CLINIC | Age: 42
End: 2022-12-06

## 2022-12-06 NOTE — TELEPHONE ENCOUNTER
Patient infusion records received from [facility] for his daily IVIG infusions. Patient infused 27 grams of Gammagard from 11/28/2022 - 12/2/2022. Patient without premedications the first 3 days. Received call from [facility] [place] [address] requesting orders for premedications. Patient premedicated the final 2 days with:    IV fluids of 500 .9ns  Benadryl 25 mg orally  Tylenol 650 mg orally    Please see attached infusion records for details of infusions and Lot numbers, expiration dates. Paperwork to scan.

## 2022-12-07 NOTE — TELEPHONE ENCOUNTER
Spoke to patient and picked up the relpax yesterday but has not yet tried it. He will try it with next headache and told him ok to combine it with naproxen. If no better then can consider adjusting the topamax for headache prevention. Patient states there has been no change in the character of the migraines. There has just been an increase in the frequency of his migraines. He expressed full understanding.

## 2022-12-21 RX ORDER — BACLOFEN 10 MG/1
TABLET ORAL
Qty: 180 TABLET | Refills: 1 | Status: SHIPPED | OUTPATIENT
Start: 2022-12-21

## 2022-12-21 RX ORDER — GABAPENTIN 300 MG/1
CAPSULE ORAL
Qty: 180 CAPSULE | Refills: 1 | Status: SHIPPED | OUTPATIENT
Start: 2022-12-21

## 2023-01-04 ENCOUNTER — OFFICE VISIT (OUTPATIENT)
Dept: NEUROLOGY | Facility: CLINIC | Age: 43
End: 2023-01-04
Payer: COMMERCIAL

## 2023-01-04 VITALS
WEIGHT: 140 LBS | RESPIRATION RATE: 18 BRPM | HEIGHT: 67 IN | OXYGEN SATURATION: 97 % | BODY MASS INDEX: 21.97 KG/M2 | SYSTOLIC BLOOD PRESSURE: 141 MMHG | HEART RATE: 109 BPM | DIASTOLIC BLOOD PRESSURE: 79 MMHG

## 2023-01-04 DIAGNOSIS — G35 MS (MULTIPLE SCLEROSIS) (HCC): Primary | ICD-10-CM

## 2023-01-04 PROCEDURE — 3078F DIAST BP <80 MM HG: CPT | Performed by: OTHER

## 2023-01-04 PROCEDURE — 99214 OFFICE O/P EST MOD 30 MIN: CPT | Performed by: OTHER

## 2023-01-04 PROCEDURE — 3008F BODY MASS INDEX DOCD: CPT | Performed by: OTHER

## 2023-01-04 PROCEDURE — 3077F SYST BP >= 140 MM HG: CPT | Performed by: OTHER

## 2023-01-05 ENCOUNTER — TELEPHONE (OUTPATIENT)
Dept: NEUROLOGY | Facility: CLINIC | Age: 43
End: 2023-01-05

## 2023-01-05 NOTE — TELEPHONE ENCOUNTER
Patient presented to office for follow up MS appointment. Patient to initiate treatment with Ocrevus infusions. Patient wishes to infuse at a [employer]. Start up paperwork initiated for patient. Labs entered in [facility]. Will fax information to [facility] to enroll in program. Will initiate referral to [facility] once labs have been obtained.

## 2023-01-24 RX ORDER — TOPIRAMATE 25 MG/1
TABLET ORAL
Qty: 180 TABLET | Refills: 1 | Status: SHIPPED | OUTPATIENT
Start: 2023-01-24

## 2023-04-04 ENCOUNTER — TELEPHONE (OUTPATIENT)
Dept: NEUROLOGY | Facility: CLINIC | Age: 43
End: 2023-04-04

## 2023-04-04 ENCOUNTER — OFFICE VISIT (OUTPATIENT)
Dept: NEUROLOGY | Facility: CLINIC | Age: 43
End: 2023-04-04
Payer: COMMERCIAL

## 2023-04-04 VITALS
HEIGHT: 67 IN | HEART RATE: 97 BPM | BODY MASS INDEX: 21.97 KG/M2 | RESPIRATION RATE: 16 BRPM | DIASTOLIC BLOOD PRESSURE: 75 MMHG | WEIGHT: 140 LBS | SYSTOLIC BLOOD PRESSURE: 116 MMHG

## 2023-04-04 DIAGNOSIS — G35 MS (MULTIPLE SCLEROSIS) (HCC): Primary | ICD-10-CM

## 2023-04-04 DIAGNOSIS — G35 MS (MULTIPLE SCLEROSIS) (HCC): ICD-10-CM

## 2023-04-04 DIAGNOSIS — G37.3 TRANSVERSE MYELITIS (HCC): Primary | ICD-10-CM

## 2023-04-04 PROCEDURE — 99214 OFFICE O/P EST MOD 30 MIN: CPT | Performed by: OTHER

## 2023-04-04 PROCEDURE — 3074F SYST BP LT 130 MM HG: CPT | Performed by: OTHER

## 2023-04-04 PROCEDURE — 3078F DIAST BP <80 MM HG: CPT | Performed by: OTHER

## 2023-04-04 PROCEDURE — 3008F BODY MASS INDEX DOCD: CPT | Performed by: OTHER

## 2023-04-07 ENCOUNTER — TELEPHONE (OUTPATIENT)
Dept: NEUROLOGY | Facility: CLINIC | Age: 43
End: 2023-04-07

## 2023-04-07 NOTE — TELEPHONE ENCOUNTER
Received eye exam consult notes from [facility] Dr. Keesha Conti DOS:  03/28/23    Placed copy in nurses [facility] for review.

## 2023-04-11 LAB
AQP4 RECEPTOR IGG SERPL QL: NEGATIVE
HEPATITIS B SURFACE$ANTIBODY QL: REACTIVE
IMMUNOGLOBULIN G: 1351 MG/DL (ref 600–1640)
IMMUNOGLOBULIN M: 83 MG/DL (ref 50–300)
MOG AB CBA, SERUM: NEGATIVE

## 2023-04-20 RX ORDER — OFATUMUMAB 20 MG/.4ML
20 INJECTION, SOLUTION SUBCUTANEOUS
Qty: 1 EACH | Refills: 11 | Status: SHIPPED | OUTPATIENT
Start: 2023-04-20

## 2023-04-20 NOTE — TELEPHONE ENCOUNTER
At last office visit patient decided to initiate treatment with [facility]. Labs available for patient and results have been evaluated and discussed with provider. Start up form faxed to [facility] with receipt confirmation. Will attempt prior authorization for medication.

## 2023-04-25 RX ORDER — OFATUMUMAB 20 MG/.4ML
20 INJECTION, SOLUTION SUBCUTANEOUS WEEKLY
Qty: 3 EACH | Refills: 0 | Status: SHIPPED | OUTPATIENT
Start: 2023-04-25

## 2023-04-25 NOTE — TELEPHONE ENCOUNTER
Prior authorization received for Kesimpta: Authorized from April 20, 2023 to April 20, 2024    Starter dosing sent to pharmacy. Start up paperwork to scanning.

## 2023-04-28 NOTE — TELEPHONE ENCOUNTER
Fax received from [email or web address]. Patient received supplemental injection training for new medication on 4/26/2023. Paperwork to scan.

## 2023-05-05 ENCOUNTER — TELEPHONE (OUTPATIENT)
Dept: NEUROLOGY | Facility: CLINIC | Age: 43
End: 2023-05-05

## 2023-05-05 NOTE — TELEPHONE ENCOUNTER
Wait to start kesimpta till cold symptoms are resolved.  He should get his covid booster and other vaccines he needs prior to starting the kesimpta

## 2023-05-05 NOTE — TELEPHONE ENCOUNTER
Patient has had a runny nose/cough for the last 3-4 days. Patient has not tested for covid yet. Patient is due for his next Covid booster and wants to know if he should get the booster prior to starting the Sidumula 30. Patient wants to know if he can start the Sidumula 30 with his current cold symptoms. RN advised we would pass the message along to the provider and get back to him. RN informed patient typically the recommendation is to get the booster and wait 2 weeks and then start the Sidumula 30. RN advised she will confirm and ask about the cold symptoms. Please advise.

## 2023-05-05 NOTE — TELEPHONE ENCOUNTER
RN called the patient and advised to wait until he is feeling 100% better, get his Covid vaccine, wait 2 weeks and then he can start his Kesimpta. Patient verbalized understanding and did not have any further questions.

## 2023-06-06 ENCOUNTER — TELEPHONE (OUTPATIENT)
Dept: NEUROLOGY | Facility: CLINIC | Age: 43
End: 2023-06-06

## 2023-06-06 DIAGNOSIS — G35 MS (MULTIPLE SCLEROSIS) (HCC): ICD-10-CM

## 2023-06-06 RX ORDER — OFATUMUMAB 20 MG/.4ML
20 INJECTION, SOLUTION SUBCUTANEOUS
Qty: 1 EACH | Refills: 11 | Status: SHIPPED | OUTPATIENT
Start: 2023-06-06

## 2023-06-06 RX ORDER — OFATUMUMAB 20 MG/.4ML
20 INJECTION, SOLUTION SUBCUTANEOUS WEEKLY
Qty: 3 EACH | Refills: 0 | Status: SHIPPED | OUTPATIENT
Start: 2023-06-06

## 2023-06-06 NOTE — TELEPHONE ENCOUNTER
Fax received from [address] [place] [address] stating they are not the in network pharmacy. Prescription for Kassie Michael needs to be dispensed from [facility]. Will forward as requested. New pharmacy updated in [facility]

## 2023-06-08 NOTE — TELEPHONE ENCOUNTER
Pt has questions about kesimpta and dispensing of the medication with correct pharmacy. Call pt to discuss and advise.

## 2023-06-08 NOTE — TELEPHONE ENCOUNTER
RN received a fax from [facility] in [place], [place] confirming the dose the patient needs of Luz Elena Franklin. RN called the patient and was informed he has received the loading dose, but has not yet started. Patient informed RN he just received his covid vaccine and will be starting soon. Per patient and he only needs the loading dose. Provider faxed the clarification fax and was faxed back at [phone].

## 2023-06-23 RX ORDER — TOPIRAMATE 25 MG/1
TABLET ORAL
Qty: 180 TABLET | Refills: 1 | Status: SHIPPED | OUTPATIENT
Start: 2023-06-23

## 2023-06-29 RX ORDER — BACLOFEN 10 MG/1
10 TABLET ORAL 2 TIMES DAILY
Qty: 180 TABLET | Refills: 1 | Status: SHIPPED | OUTPATIENT
Start: 2023-06-29

## 2023-06-29 RX ORDER — GABAPENTIN 300 MG/1
300 CAPSULE ORAL 2 TIMES DAILY
Qty: 180 CAPSULE | Refills: 1 | Status: SHIPPED | OUTPATIENT
Start: 2023-06-29

## 2023-06-29 RX ORDER — BACLOFEN 10 MG/1
10 TABLET ORAL 2 TIMES DAILY
Qty: 180 TABLET | Refills: 1 | Status: CANCELLED | OUTPATIENT
Start: 2023-06-29

## 2023-07-28 ENCOUNTER — PATIENT MESSAGE (OUTPATIENT)
Dept: NEUROLOGY | Facility: CLINIC | Age: 43
End: 2023-07-28

## 2023-07-28 NOTE — TELEPHONE ENCOUNTER
From: Jae German  To: Mila Lomeli MD  Sent: 7/28/2023 10:52 AM CDT  Subject: Deo Pedersen,     I started taking Hafsa Spurling in June. Is it safe for me to visit my Dad in the [facility] in November while on this medication because it is a immunosuppressant. Do I need to get any blood work done or vaccines?      Thank you,  Luis King

## 2023-07-28 NOTE — TELEPHONE ENCOUNTER
Spoek with patient no special blood work, OK just exercise extra precaution such as wearing masks etc

## 2023-10-04 ENCOUNTER — OFFICE VISIT (OUTPATIENT)
Dept: NEUROLOGY | Facility: CLINIC | Age: 43
End: 2023-10-04
Payer: COMMERCIAL

## 2023-10-04 VITALS
DIASTOLIC BLOOD PRESSURE: 84 MMHG | RESPIRATION RATE: 16 BRPM | SYSTOLIC BLOOD PRESSURE: 140 MMHG | HEIGHT: 67 IN | WEIGHT: 140 LBS | HEART RATE: 110 BPM | BODY MASS INDEX: 21.97 KG/M2

## 2023-10-04 DIAGNOSIS — G35 MS (MULTIPLE SCLEROSIS) (HCC): Primary | ICD-10-CM

## 2023-10-04 DIAGNOSIS — G43.009 MIGRAINE WITHOUT AURA AND WITHOUT STATUS MIGRAINOSUS, NOT INTRACTABLE: ICD-10-CM

## 2023-10-04 PROCEDURE — 3079F DIAST BP 80-89 MM HG: CPT | Performed by: OTHER

## 2023-10-04 PROCEDURE — 3077F SYST BP >= 140 MM HG: CPT | Performed by: OTHER

## 2023-10-04 PROCEDURE — 3008F BODY MASS INDEX DOCD: CPT | Performed by: OTHER

## 2023-10-04 PROCEDURE — 99214 OFFICE O/P EST MOD 30 MIN: CPT | Performed by: OTHER

## 2023-10-12 LAB
ABSOLUTE BASOPHILS: 31 CELLS/UL (ref 0–200)
ABSOLUTE EOSINOPHILS: 180 CELLS/UL (ref 15–500)
ABSOLUTE LYMPHOCYTES: 849 CELLS/UL (ref 850–3900)
ABSOLUTE MONOCYTES: 396 CELLS/UL (ref 200–950)
ABSOLUTE NEUTROPHILS: 2944 CELLS/UL (ref 1500–7800)
BASOPHILS: 0.7 %
EOSINOPHILS: 4.1 %
HEMATOCRIT: 45.7 % (ref 38.5–50)
HEMOGLOBIN: 13.9 G/DL (ref 13.2–17.1)
LYMPHOCYTES: 19.3 %
MCH: 21.1 PG (ref 27–33)
MCHC: 30.4 G/DL (ref 32–36)
MCV: 69.2 FL (ref 80–100)
MONOCYTES: 9 %
MPV: 10.1 FL (ref 7.5–12.5)
NEUTROPHILS: 66.9 %
PLATELET COUNT: 269 THOUSAND/UL (ref 140–400)
RDW: 17.6 % (ref 11–15)
RED BLOOD CELL COUNT: 6.6 MILLION/UL (ref 4.2–5.8)
TSH W/REFLEX TO FT4: 1.73 MIU/L (ref 0.4–4.5)
VITAMIN B12: 548 PG/ML (ref 200–1100)
VITAMIN D, 25-OH, TOTAL: 41 NG/ML (ref 30–100)
WHITE BLOOD CELL COUNT: 4.4 THOUSAND/UL (ref 3.8–10.8)

## 2023-10-12 NOTE — PROGRESS NOTES
Yash    LAbs showed low red blood cells.   The absolute lymphocyte is on low side but that is to be expected with kesimpta  Recommend iron supplementation    Dr. Heather Menendez

## 2023-11-07 RX ORDER — TOPIRAMATE 25 MG/1
25 TABLET ORAL NIGHTLY
Qty: 90 TABLET | Refills: 3 | Status: SHIPPED | OUTPATIENT
Start: 2023-11-07

## 2023-11-07 RX ORDER — TADALAFIL 20 MG/1
20 TABLET ORAL
Qty: 4 TABLET | Refills: 11 | Status: SHIPPED | OUTPATIENT
Start: 2023-11-07

## 2023-11-07 RX ORDER — GABAPENTIN 300 MG/1
300 CAPSULE ORAL 2 TIMES DAILY
Qty: 180 CAPSULE | Refills: 3 | Status: SHIPPED | OUTPATIENT
Start: 2023-11-07

## 2023-11-07 RX ORDER — BACLOFEN 10 MG/1
10 TABLET ORAL 2 TIMES DAILY
Qty: 180 TABLET | Refills: 3 | Status: SHIPPED | OUTPATIENT
Start: 2023-11-07

## 2023-11-07 RX ORDER — ELETRIPTAN HYDROBROMIDE 40 MG/1
TABLET, FILM COATED ORAL
Qty: 8 TABLET | Refills: 5 | Status: SHIPPED | OUTPATIENT
Start: 2023-11-07

## 2023-11-07 NOTE — TELEPHONE ENCOUNTER
Medication: Eletriptan Hydrobromide 40 MG      Date of last refill: 11/22/21 (#8/2R)  [place]     Last office visit: 10/4/23  Due back to clinic per last office note:  6 mon  Date next office visit scheduled:    Future Appointments   Date Time Provider Dileep Snowdeni   4/8/2024 11:20 AM MD ELMO Espino [facility]           Last OV note recommendation:    Problem/s Identified this visit:   MS (multiple sclerosis) ([facility] [address]  (primary encounter diagnosis)  Migraine without aura and without status migrainosus, not intractable        Discussion plus Diagnostics & Treatment Orders:  Reduce Topamax to 25 mg at night this may improve the word finding normal problem and if he continues to be stable we can discontinue it for a while. He is traveling to the [facility] in November to December and is concerned about his [facility]. I told him that if it is just a few days delayed to the next injection that he does not have to worry about bringing the medication to the [facility].      Orders Placed This Encounter      TSH W Reflex To Free T4          Standing Status: Future          Standing Expiration Date: 10/4/2024          Order Specific Question: Release to patient          Answer: Immediate      CBC W Differential W Platelet          Standing Status: Future          Standing Expiration Date: 10/4/2024          Order Specific Question: Release to patient          Answer: Immediate      Vitamin B12          Standing Status: Future          Standing Expiration Date: 10/3/2024      Vitamin D [[facility] lab only]          Standing Status: Future          Standing Expiration Date: 10/4/2024          Order Specific Question: Please pick the scenario that best fits the purpose for ordering this test          Answer: General Screening/Vit D deficiency (25-Hydroxy)          Order Specific Question: Release to patient          Answer: Immediate

## 2023-11-07 NOTE — TELEPHONE ENCOUNTER
Medication: Cialis 20 MG, Topiramate 25 mg,baclofen 10 mg & Gabapentin 300 mg     Date of last refill: 06/29/2023 & 04/19/2023 with 1 addt refill  [place]      Last office visit: 10/04/2023  Due back to clinic per last office note:    Date next office visit scheduled:    Future Appointments   Date Time Provider Dileep Hammer   4/8/2024 11:20 AM [facility]           Last OV note recommendation:      Discussion plus Diagnostics & Treatment Orders:  Reduce Topamax to 25 mg at night this may improve the word finding normal problem and if he continues to be stable we can discontinue it for a while. He is traveling to the [facility] in November to December and is concerned about his Maribel Flattery. I told him that if it is just a few days delayed to the next injection that he does not have to worry about bringing the medication to the [facility]. Orders Placed This Encounter      TSH W Reflex To Free T4          Standing Status: Future          Standing Expiration Date: 10/4/2024          Order Specific Question: Release to patient          Answer: Immediate      CBC W Differential W Platelet          Standing Status: Future          Standing Expiration Date: 10/4/2024          Order Specific Question: Release to patient          Answer: Immediate      Vitamin B12          Standing Status: Future          Standing Expiration Date: 10/3/2024      Vitamin D [[facility] lab only]          Standing Status: Future          Standing Expiration Date: 10/4/2024          Order Specific Question: Please pick the scenario that best fits the purpose for ordering this test          Answer: General Screening/Vit D deficiency (25-Hydroxy)          Order Specific Question: Release to patient          Answer: Immediate           (x) Discussed potential side effects of any treatment relevant to above. Includes explanation of tests as necessary.      Return in about 6 months (around 4/4/2024).

## 2023-12-13 ENCOUNTER — PATIENT MESSAGE (OUTPATIENT)
Dept: NEUROLOGY | Facility: CLINIC | Age: 43
End: 2023-12-13

## 2024-04-08 ENCOUNTER — OFFICE VISIT (OUTPATIENT)
Dept: NEUROLOGY | Facility: CLINIC | Age: 44
End: 2024-04-08
Payer: COMMERCIAL

## 2024-04-08 ENCOUNTER — TELEPHONE (OUTPATIENT)
Dept: NEUROLOGY | Facility: CLINIC | Age: 44
End: 2024-04-08

## 2024-04-08 VITALS
OXYGEN SATURATION: 97 % | HEART RATE: 100 BPM | WEIGHT: 150 LBS | BODY MASS INDEX: 23.54 KG/M2 | RESPIRATION RATE: 18 BRPM | DIASTOLIC BLOOD PRESSURE: 74 MMHG | SYSTOLIC BLOOD PRESSURE: 125 MMHG | HEIGHT: 67 IN

## 2024-04-08 DIAGNOSIS — G43.009 MIGRAINE WITHOUT AURA AND WITHOUT STATUS MIGRAINOSUS, NOT INTRACTABLE: ICD-10-CM

## 2024-04-08 DIAGNOSIS — G35 MS (MULTIPLE SCLEROSIS) (HCC): Primary | ICD-10-CM

## 2024-04-08 PROCEDURE — 99214 OFFICE O/P EST MOD 30 MIN: CPT | Performed by: OTHER

## 2024-04-08 PROCEDURE — 3008F BODY MASS INDEX DOCD: CPT | Performed by: OTHER

## 2024-04-08 PROCEDURE — 3074F SYST BP LT 130 MM HG: CPT | Performed by: OTHER

## 2024-04-08 PROCEDURE — 3078F DIAST BP <80 MM HG: CPT | Performed by: OTHER

## 2024-04-08 RX ORDER — TOPIRAMATE 25 MG/1
25 TABLET ORAL 2 TIMES DAILY
Qty: 180 TABLET | Refills: 3 | Status: SHIPPED | OUTPATIENT
Start: 2024-04-08

## 2024-04-08 RX ORDER — ELETRIPTAN HYDROBROMIDE 40 MG/1
TABLET, FILM COATED ORAL
Qty: 16 TABLET | Refills: 5 | Status: SHIPPED | OUTPATIENT
Start: 2024-04-08

## 2024-04-08 NOTE — PATIENT INSTRUCTIONS
Refill policies:    Allow 2-3 business days for refills; controlled substances may take longer.  Contact your pharmacy at least 5 days prior to running out of medication and have them send an electronic request or submit request through the “request refill” option in your [employer] account.  Refills are not addressed on weekends; covering physicians do not authorize routine medications on weekends.  No narcotics or controlled substances are refilled after noon on Fridays or by on call physicians.  By law, narcotics must be electronically prescribed.  A 30 day supply with no refills is the maximum allowed.  If your prescription is due for a refill, you may be due for a follow up appointment.  To best provide you care, patients receiving routine medications need to be seen at least once a year.  Patients receiving narcotic/controlled substance medications need to be seen at least once every 3 months.  In the event that your preferred pharmacy does not have the requested medication in stock (e.g. Backordered), it is your responsibility to find another pharmacy that has the requested medication available.  We will gladly send a new prescription to that pharmacy at your request.    Scheduling Tests:    If your physician has ordered radiology tests such as MRI or CT scans, please contact Central Scheduling at [phone] right away to schedule the test.  Once scheduled, the [facility] Centralized Referral Team will work with your insurance carrier to obtain pre-certification or prior authorization.  Depending on your insurance carrier, approval may take 3-10 days.  It is highly recommended patients assure they have received an authorization before having a test performed.  If test is done without insurance authorization, patient may be responsible for the entire amount billed.      Precertification and Prior Authorizations:  If your physician has recommended that you have a procedure or additional testing performed the [facility]  Centralized Referral Team will contact your insurance carrier to obtain pre-certification or prior authorization.    You are strongly encouraged to contact your insurance carrier to verify that your procedure/test has been approved and is a COVERED benefit.  Although the [facility] Centralized Referral Team does its due diligence, the insurance carrier gives the disclaimer that \"Although the procedure is authorized, this does not guarantee payment.\"    Ultimately the patient is responsible for payment.   Thank you for your understanding in this matter.  Paperwork Completion:  If you require FMLA or disability paperwork for your recovery, please make sure to either drop it off or have it faxed to our office at [phone]. Be sure the form has your name and date of birth on it.  The form will be faxed to our Forms Department and they will complete it for you.  There is a 25$ fee for all forms that need to be filled out.  Please be aware there is a 10-14 day turnaround time.  You will need to sign a release of information (LISA) form if your paperwork does not come with one.  You may call the Forms Department with any questions at [phone].  Their fax number is [phone].

## 2024-04-08 NOTE — PROGRESS NOTES
NEUROLOGY  [facility]       Yash Alarcon  2/2/1980  Primary Care Provider:  Brooklyn Olmos MD    4/8/2024  44 year old yo,  was last seen on:: OCtober    Seen for/plans last visit:  MS and migraines    Previous visit and existing record notes reviewed in preparation for the face to face visit.  Relevant labs and studies reviewed and will be noted in relevant areas of this record.  Accompanied visit:     (x) No.      Present condition:  Had Covid after returning from [facility],    January started having more migraines    Feels that his left leg has become weaker 2-3 months ago  Sensations in lowers have become less.    Past History update/new problem(s): as above    Review of Systems:  Review of Systems:  Denies systemic symptoms     No CP or SOB.  No GI or  symptoms. Relevant Neuro as noted above.      Medications:      Current Outpatient Medications:     Ofatumumab (KESIMPTA) 20 MG/0.4ML Subcutaneous Solution Auto-injector, Inject 20 mg into the skin every 28 days., Disp: 3 each, Rfl: 3    Tadalafil (CIALIS) 20 MG Oral Tab, Take 1 tablet (20 mg total) by mouth daily as needed for Erectile Dysfunction., Disp: 4 tablet, Rfl: 11    topiramate 25 MG Oral Tab, Take 1 tablet (25 mg total) by mouth at bedtime., Disp: 90 tablet, Rfl: 3    baclofen 10 MG Oral Tab, Take 1 tablet (10 mg total) by mouth 2 (two) times daily., Disp: 180 tablet, Rfl: 3    gabapentin 300 MG Oral Cap, Take 1 capsule (300 mg total) by mouth 2 (two) times daily., Disp: 180 capsule, Rfl: 3    Eletriptan Hydrobromide 40 MG Oral Tab, use at onset; may repeat once after 4 hours- ONLY 2 IN 24 HOUR PERIOD MAX.  This is a 30 day supply., Disp: 8 tablet, Rfl: 5    Incontinence Supplies (URINARY DRAINAGE BAG) Does not apply Misc, Please provide incontinence products for patient, including: external catheters, urine bags, Disp: 4 each, Rfl: 5    Incontinence Supply Disposable (PROCARE ADULT BRIEFS MEDIUM) Does not apply  Misc, Please provide briefs and under pads, Disp: 25 each, Rfl: 2    Incontinence Supply Disposable (UNDERPADS EXTRA LARGE) Does not apply Misc, Please provide incontinence supplies, Disp: 20 each, Rfl: 2  PRN:     Allergies:  No Known Allergies       EXAM:  /74   Pulse 100   Resp 18   Ht 67\"   Wt 150 lb (68 kg)   SpO2 97%   BMI 23.49 kg/m²   Looks stated age  General Exam:  HENT:  pink conjunctiva anicteric sclerae  Neck no adenopathy, thyroid normal  Heart and Lungs:  normal  Extremities: no cyanosis, skin changes    NEURO  Seems to be struggling more dragging legs but more on the left  No arm drift        INTERPRETATION of RELEVANT LABS and other DATA:    Component      Latest Ref Rng 10/11/2023   WBC      3.8 - 10.8 Thousand/uL 4.4    RBC      4.20 - 5.80 Million/uL 6.60 (H)    Hemoglobin      13.2 - 17.1 g/dL 13.9    Hematocrit      38.5 - 50.0 % 45.7    MCV      80.0 - 100.0 fL 69.2 (L)    MCH      27.0 - 33.0 pg 21.1 (L)    MCHC      32.0 - 36.0 g/dL 30.4 (L)    RDW      11.0 - 15.0 % 17.6 (H)    Platelet Count      140 - 400 Thousand/uL 269    MPV      7.5 - 12.5 fL 10.1    Neutrophils Absolute      1,500 - 7,800 cells/uL 2,944    Lymphocytes Absolute      850 - 3,900 cells/uL 849 (L)    Monocytes Absolute      200 - 950 cells/uL 396    Eosinophils Absolute      15 - 500 cells/uL 180    Basophils Absolute      0 - 200 cells/uL 31    Neutrophils %      % 66.9    Lymphocytes %      % 19.3    Monocytes %      % 9.0    Eosinophils %      % 4.1    Basophils %      % 0.7    COMMENT(S) --    Vitamin B12      200 - 1,100 pg/mL 548    TSH      0.40 - 4.50 mIU/L 1.73    VITAMIN D, 25-OH, TOTAL      30 - 100 ng/mL 41       Legend:  (H) High  (L) Low      OCTOBER 2023 imaging  Cord    IMPRESSION:   1. Abnormal mildly hyperintense T2/FLAIR signal and mild enhancement in the dorsal spinal cord at the lower T11 vertebral level/T11-T12 disc level with decreased volume of the spinal cord at this level. The  findings remain compatible with a demyelinating    lesion of multiple sclerosis. No significant change compared to the 11/2022 MRI.     BRAIN  IMPRESSION:     Several nonenhancing small T2/FLAIR hyperintense signal foci in the supratentorial white matter are essentially stable since since the prior examination.       Problem/s Identified this visit:   1. MS (multiple sclerosis) (HCC)    2. 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  Includes explanation of tests as necessary.    No follow-ups on file.      Patient understands that if needed, based on condition and or test results, follow up will be readjusted      Rojas Hutson MD  Vascular & General Neurology  Director, Multiple Sclerosis Program  [facility]  4/8/2024, Time completed 11:34 AM    Decision making:  ( x ) labs reviewed/ordered - 1  (  ) new diagnosis: - 1  ( x) Images & studies independently reviewed -non F2F  (  ) Case/studies discussed with other caregivers - -non F2F  (  ) Telephone time with patiern or authorized [facility] member--non F2F  ( x ) other records reviewed --non F2F including consultations  (  ) [facility] meetings - patient not present --non F2F  (  ) Independent Historian obtained    Non Face to Face CPT code 62050/65866 applies as documented above    PROCEDURE DONE     (   ) see notes        After visit, patient was escorted out and handed-off discharge process and instructions to the check out desk.  No additional issues relevant to visit were raised to staff at this time interval.        This document is to be interpreted as my current opinion regarding the case as of the stated date of service based on the information available to me at this time and may supersedes any prior  opinion expressed either orally or in writing.  Services rendered are only within the scope of direct medical care  Sometimes, reports may have been prepared partially using a speech recognition software technology.  If a word or phrase is confusing or out of context, please do not hesitate to call for clarification.

## 2024-04-08 NOTE — TELEPHONE ENCOUNTER
Patient to have IVIG treatment for five days at [facility].  Face sheet, insurance, OV notes from today faxed to [facility] @ [phone].

## 2024-04-25 NOTE — TELEPHONE ENCOUNTER
Received request for documentation of interferon failure. Per Epic review, this happened previously with IVIG request. Letter pended for MD signature.

## 2024-05-14 ENCOUNTER — MED REC SCAN ONLY (OUTPATIENT)
Dept: NEUROLOGY | Facility: CLINIC | Age: 44
End: 2024-05-14

## 2024-05-14 NOTE — TELEPHONE ENCOUNTER
Received call from [employer] with questions on premedications.    Patient is receiving his IVIG treatments this week.    Orders to scanning.

## 2024-05-18 ENCOUNTER — TELEPHONE (OUTPATIENT)
Dept: NEUROLOGY | Facility: CLINIC | Age: 44
End: 2024-05-18

## 2024-05-18 RX ORDER — ONDANSETRON 4 MG/1
4 TABLET, FILM COATED ORAL EVERY 12 HOURS PRN
Qty: 10 TABLET | Refills: 0 | Status: SHIPPED | OUTPATIENT
Start: 2024-05-18

## 2024-05-18 RX ORDER — TIZANIDINE 2 MG/1
2 TABLET ORAL EVERY 8 HOURS PRN
Qty: 30 TABLET | Refills: 1 | Status: SHIPPED | OUTPATIENT
Start: 2024-05-18

## 2024-05-18 NOTE — TELEPHONE ENCOUNTER
Had IVIG infusion for five days Monday through Friday for multiple sclerosis (has received before). Headache started on Tuesday,took eletriptan Wednesday has persisted. Advised to stop taking eletriptan due to concern for rebound headache. Excedrin helped on Thursday. Headache persist. Headache feels like typical migraine but more severe, has light and sound sensitivity, also some nausea and positional lightheadedness. No new symptoms with migraine like fever, focal weakness, focal sensory change, gait change.    Imp: breakthrough migraine of typical semiology in setting of IVIG    Plan:   -Stay well hydrated  -Reasonable to try excedrin migraine again (up to three times per week or fewer)  -Trial caffeine  -Send as needed ondansetron for nausea (can cause sedation, has no heart history per patient) and tizanidine is for migraine (can cause sedation, lightheadedness)  -Provide update to Dr. Hutson on Monday, or sooner to triage line if worsenign.     Nae Lainez, DO

## 2024-05-22 ENCOUNTER — TELEPHONE (OUTPATIENT)
Dept: NEUROLOGY | Facility: CLINIC | Age: 44
End: 2024-05-22

## 2024-05-22 NOTE — TELEPHONE ENCOUNTER
IVIG infusion records received for daily IVIG infusions at [facility] from 5/13/2024 - 5/17/2024.    Patient tolerated infusions without infusion reactions.    Patient received 27.5 gms of Gammagard daily.    Premedicated with:    Tylenol 650 mg orally  Benadryl 25 mg orally    Patient with headache unrelieved with Tylenol on days # 4 and 5.  Given IV hydration of 500 ml of 0.9ns prior to infusion.    Will order hydration in the future if needed.    Paperwork to scanning on med rec scan dated: 5/14/2024

## 2024-07-08 ENCOUNTER — TELEPHONE (OUTPATIENT)
Dept: NEUROLOGY | Facility: CLINIC | Age: 44
End: 2024-07-08

## 2024-07-08 ENCOUNTER — OFFICE VISIT (OUTPATIENT)
Dept: NEUROLOGY | Facility: CLINIC | Age: 44
End: 2024-07-08
Payer: COMMERCIAL

## 2024-07-08 VITALS
WEIGHT: 160 LBS | BODY MASS INDEX: 25.11 KG/M2 | HEART RATE: 90 BPM | HEIGHT: 67 IN | SYSTOLIC BLOOD PRESSURE: 106 MMHG | RESPIRATION RATE: 16 BRPM | DIASTOLIC BLOOD PRESSURE: 78 MMHG

## 2024-07-08 DIAGNOSIS — G35 MS (MULTIPLE SCLEROSIS) (HCC): Primary | ICD-10-CM

## 2024-07-08 DIAGNOSIS — G43.009 MIGRAINE WITHOUT AURA AND WITHOUT STATUS MIGRAINOSUS, NOT INTRACTABLE: ICD-10-CM

## 2024-07-08 PROCEDURE — 3078F DIAST BP <80 MM HG: CPT | Performed by: PHYSICIAN ASSISTANT

## 2024-07-08 PROCEDURE — 99213 OFFICE O/P EST LOW 20 MIN: CPT | Performed by: PHYSICIAN ASSISTANT

## 2024-07-08 PROCEDURE — 3008F BODY MASS INDEX DOCD: CPT | Performed by: PHYSICIAN ASSISTANT

## 2024-07-08 PROCEDURE — 3074F SYST BP LT 130 MM HG: CPT | Performed by: PHYSICIAN ASSISTANT

## 2024-07-08 NOTE — PROGRESS NOTES
NEUROLOGY  [facility]       Previous visit and existing record notes reviewed in preparation for the face to face visit.  Relevant labs and studies reviewed and will be noted in relevant areas of this record.    Yash Alarcon  2/2/1980  Primary Care Provider:  Brooklyn Olmos MD    7/8/2024  44 year old male,      was last seen on:  Patient was last seen 4/2024 and at that time he had complaints of increasing weakness in the left leg and increased numbness in bilateral lower extremities. Due to hx of central serous chorioretinopathy he was treated with IVIG for 5 days    Seen for/plans last visit:  Multiple Sclerosis    Accompanied visit: No    Present condition:    He completed IVIG treatment in May. He did not see any improvement in his symptoms with the IVIG treatment. The IVIG treatment triggered migraines.The migraines have improved after completing the treatment. He has previously done IVIG about 2 years ago and he does recall having a positive response.  He also informs me that the IVIG has been denied by his insurance  He has been on the kesimpta for a year now. He is tolerating it without side effects  He denies any double vision, loss of vision or eye pain  He continue to have left lower extremity weakness  He denies any falls      MRI Thoracic Spine(10/16/23)    IMPRESSION:   1. Abnormal mildly hyperintense T2/FLAIR signal and mild enhancement in the dorsal spinal cord at the lower T11 vertebral level/T11-T12 disc level with decreased volume of the spinal cord at this level. The findings remain compatible with a demyelinating    lesion of multiple sclerosis. No significant change compared to the 11/2022 MRI.     2. Thoracic spine alignment is normal and unchanged. Thoracic disc heights and signal remain normal with no disc herniations or significant disc bulges identified. No thoracic spinal or neural foraminal stenosis.       MRI Brain(10/16/23)    IMPRESSION:     Several  nonenhancing small T2/FLAIR hyperintense signal foci in the supratentorial white matter are essentially stable since since the prior examination.     MRI Lumbar Spine(11/3/22)  IMPRESSION:   1. Minimal lumbar spondylosis. No significant disc bulge or protrusion. No significant spinal or neural foraminal stenosis.   2. No signal abnormality at the conus medullaris.   3. Lower thoracic cord signal abnormality at the T11-12 level with associated volume loss and enhancement. Thoracic spine MRI reported separately.   4. Incidentally noted markedly distended urinary bladder with trabeculation, raising suspicion for neurogenic bladder.     Review of Systems:  Review of Systems:  Denies systemic symptoms     No CP or SOB.  No GI or  symptoms. Relevant Neuro as noted above.    Past Medical History:    Multiple sclerosis (HCC)         Medications:      Current Outpatient Medications:     ondansetron (ZOFRAN) 4 mg tablet, Take 1 tablet (4 mg total) by mouth every 12 (twelve) hours as needed for Nausea (nausea related to migraine (can cause sleepiness))., Disp: 10 tablet, Rfl: 0    tiZANidine 2 MG Oral Tab, Take 1 tablet (2 mg total) by mouth every 8 (eight) hours as needed (for migraine (can cause lightheadedness))., Disp: 30 tablet, Rfl: 1    topiramate 25 MG Oral Tab, Take 1 tablet (25 mg total) by mouth 2 (two) times daily., Disp: 180 tablet, Rfl: 3    Eletriptan Hydrobromide 40 MG Oral Tab, use at onset; may repeat once after 4 hours- ONLY 2 IN 24 HOUR PERIOD MAX.  This is a 30 day supply., Disp: 16 tablet, Rfl: 5    Ofatumumab (KESIMPTA) 20 MG/0.4ML Subcutaneous Solution Auto-injector, Inject 20 mg into the skin every 28 days., Disp: 3 each, Rfl: 3    Tadalafil (CIALIS) 20 MG Oral Tab, Take 1 tablet (20 mg total) by mouth daily as needed for Erectile Dysfunction., Disp: 4 tablet, Rfl: 11    baclofen 10 MG Oral Tab, Take 1 tablet (10 mg total) by mouth 2 (two) times daily., Disp: 180 tablet, Rfl: 3    gabapentin 300 MG  Oral Cap, Take 1 capsule (300 mg total) by mouth 2 (two) times daily., Disp: 180 capsule, Rfl: 3    Incontinence Supplies (URINARY DRAINAGE BAG) Does not apply Misc, Please provide incontinence products for patient, including: external catheters, urine bags, Disp: 4 each, Rfl: 5    Incontinence Supply Disposable (PROCARE ADULT BRIEFS MEDIUM) Does not apply Misc, Please provide briefs and under pads, Disp: 25 each, Rfl: 2    Incontinence Supply Disposable (UNDERPADS EXTRA LARGE) Does not apply Misc, Please provide incontinence supplies, Disp: 20 each, Rfl: 2  PRN:     Allergies:  No Known Allergies       EXAM:  /78 (BP Location: Left arm, Patient Position: Sitting, Cuff Size: adult)   Pulse 90   Resp 16   Ht 67\"   Wt 160 lb (72.6 kg)   BMI 25.06 kg/m²   Looks stated age  General Exam:  HENT:  pink conjunctiva anicteric sclerae  Neck no adenopathy, thyroid normal  Heart and Lungs:  normal  Extremities: no cyanosis, skin changes    NEURO  NAD, A&Ox3  EOMI  CN II-XII intact  Strength 5/5 bilateral upper extremities  No movement in the left lower extremity  Strength 2/5 right quadriceps  Strength 4/5 right knee flexion/extension  Strength 2/5 right dorsiflexion  DTRs 3+ patellar bilaterally  DTRs 3+ biceps,triceps and brachioradialis bilaterally  FTN intact bilaterally. No drift on exam  Drags both feet with walking.   Using 2 canes         Problem/s Identified this visit:   1. MS (multiple sclerosis) (HCC)    2. Migraine without aura and without status migrainosus, not intractable          Discussion plus Diagnostics & Treatment Orders:    Patient is a 44 year old male here for follow-up of his MS. Since his last visit he completed a 5 day course of IVIG in May 2024 without improvement in his left lower extremity weakness and  bilateral lower extremity numbness. This was a worsening of a known symptom. Recommend proceeding with follow-up MRIs in October. Patient will call for MRI orders when ready to proceed.  At this point this was first time symptom worsening since being on the kesimpta so would not consider this a failure at this point. If he has another flare-up or lesion progression on MRIs then may need to consider a switch in DMT.  He expressed full understanding    Migraines- improved off the IVIG. Continue the topamax 25mg bid. Can continue the relpax for abortive tx.      (X) Discussed potential side effects of any treatment relevant to above.  Includes explanation of tests as necessary.    Return in about 4 months (around 11/8/2024).      Patient understands that if needed, based on condition and or test results, follow up will be readjusted    Bessy Nathan PA-C  [facility]  7/8/2024, Time completed 10:54 AM

## 2024-07-08 NOTE — PATIENT INSTRUCTIONS
Refill policies:    Allow 2-3 business days for refills; controlled substances may take longer.  Contact your pharmacy at least 5 days prior to running out of medication and have them send an electronic request or submit request through the “request refill” option in your [employer] account.  Refills are not addressed on weekends; covering physicians do not authorize routine medications on weekends.  No narcotics or controlled substances are refilled after noon on Fridays or by on call physicians.  By law, narcotics must be electronically prescribed.  A 30 day supply with no refills is the maximum allowed.  If your prescription is due for a refill, you may be due for a follow up appointment.  To best provide you care, patients receiving routine medications need to be seen at least once a year.  Patients receiving narcotic/controlled substance medications need to be seen at least once every 3 months.  In the event that your preferred pharmacy does not have the requested medication in stock (e.g. Backordered), it is your responsibility to find another pharmacy that has the requested medication available.  We will gladly send a new prescription to that pharmacy at your request.    Scheduling Tests:    If your physician has ordered radiology tests such as MRI or CT scans, please contact Central Scheduling at [phone] right away to schedule the test.  Once scheduled, the [facility] Centralized Referral Team will work with your insurance carrier to obtain pre-certification or prior authorization.  Depending on your insurance carrier, approval may take 3-10 days.  It is highly recommended patients assure they have received an authorization before having a test performed.  If test is done without insurance authorization, patient may be responsible for the entire amount billed.      Precertification and Prior Authorizations:  If your physician has recommended that you have a procedure or additional testing performed the [facility]  Centralized Referral Team will contact your insurance carrier to obtain pre-certification or prior authorization.    You are strongly encouraged to contact your insurance carrier to verify that your procedure/test has been approved and is a COVERED benefit.  Although the [facility] Centralized Referral Team does its due diligence, the insurance carrier gives the disclaimer that \"Although the procedure is authorized, this does not guarantee payment.\"    Ultimately the patient is responsible for payment.   Thank you for your understanding in this matter.  Paperwork Completion:  If you require FMLA or disability paperwork for your recovery, please make sure to either drop it off or have it faxed to our office at [phone]. Be sure the form has your name and date of birth on it.  The form will be faxed to our Forms Department and they will complete it for you.  There is a 25$ fee for all forms that need to be filled out.  Please be aware there is a 10-14 day turnaround time.  You will need to sign a release of information (LISA) form if your paperwork does not come with one.  You may call the Forms Department with any questions at [phone].  Their fax number is [phone].

## 2024-07-08 NOTE — TELEPHONE ENCOUNTER
Patient states he was told by [facility] that insurance would cover infusion.  Patient to call insurance assistance with [facility] in inquire and will contact this office if needed.

## 2024-11-01 ENCOUNTER — TELEPHONE (OUTPATIENT)
Dept: NEUROLOGY | Facility: CLINIC | Age: 44
End: 2024-11-01

## 2024-11-01 DIAGNOSIS — G35 MS (MULTIPLE SCLEROSIS) (HCC): Primary | ICD-10-CM

## 2024-11-01 NOTE — TELEPHONE ENCOUNTER
Per note below, MRI's ordered at [facility].  Patient informed.    Discussion plus Diagnostics & Treatment Orders:     Patient is a 44 year old male here for follow-up of his MS. Since his last visit he completed a 5 day course of IVIG in May 2024 without improvement in his left lower extremity weakness and  bilateral lower extremity numbness. This was a worsening of a known symptom. Recommend proceeding with follow-up MRIs in October. Patient will call for MRI orders when ready to proceed. At this point this was first time symptom worsening since being on the kesimpta so would not consider this a failure at this point. If he has another flare-up or lesion progression on MRIs then may need to consider a switch in DMT.  He expressed full understanding     Migraines- improved off the IVIG. Continue the topamax 25mg bid. Can continue the relpax for abortive tx.        (X) Discussed potential side effects of any treatment relevant to above.  Includes explanation of tests as necessary.     Return in about 4 months (around 11/8/2024).        Patient understands that if needed, based on condition and or test results, follow up will be readjusted     Bessy Nathan PA-C  [facility]  7/8/2024, Time completed 10:54 AM

## 2024-11-01 NOTE — TELEPHONE ENCOUNTER
Patient calling, advised per last conversation with Bessy, she wanted to have patient get repeat imaging around this time.     Please advise writing order for MRI, and call patient when order placed.     Patient would like to use [facility] imaging.

## 2024-11-08 RX ORDER — GABAPENTIN 300 MG/1
300 CAPSULE ORAL 2 TIMES DAILY
Qty: 180 CAPSULE | Refills: 3 | Status: SHIPPED | OUTPATIENT
Start: 2024-11-08

## 2024-11-08 RX ORDER — BACLOFEN 10 MG/1
10 TABLET ORAL 2 TIMES DAILY
Qty: 180 TABLET | Refills: 3 | Status: SHIPPED | OUTPATIENT
Start: 2024-11-08

## 2024-11-08 RX ORDER — BACLOFEN 10 MG/1
10 TABLET ORAL 2 TIMES DAILY
Qty: 180 TABLET | Refills: 0 | Status: SHIPPED | OUTPATIENT
Start: 2024-11-08

## 2024-11-08 RX ORDER — TOPIRAMATE 25 MG/1
25 TABLET, FILM COATED ORAL 2 TIMES DAILY
Qty: 180 TABLET | Refills: 3 | Status: SHIPPED | OUTPATIENT
Start: 2024-11-08

## 2024-11-08 NOTE — TELEPHONE ENCOUNTER
Patient calling he is requesting Gabapentin and topomax he stated pharmacy had send a request last week       [address] [place] [phone], [phone]

## 2024-11-08 NOTE — TELEPHONE ENCOUNTER
Medication: Gabapentin & Baclofen 10 mg & Topiramate 25 mg     Date of last refill: 11/07/2023,04/08/2024  [place]      Last office visit: 07/08/2024  Due back to clinic per last office note:    Date next office visit scheduled:           Future Appointments   Date Time Provider Department Center   11/18/2024 10:00 AM Bessy Nathan PA [facility]            Last OV note recommendation:     Discussion plus Diagnostics & Treatment Orders:     Patient is a 44 year old male here for follow-up of his MS. Since his last visit he completed a 5 day course of IVIG in May 2024 without improvement in his left lower extremity weakness and  bilateral lower extremity numbness. This was a worsening of a known symptom. Recommend proceeding with follow-up MRIs in October. Patient will call for MRI orders when ready to proceed. At this point this was first time symptom worsening since being on the kesimpta so would not consider this a failure at this point. If he has another flare-up or lesion progression on MRIs then may need to consider a switch in DMT.  He expressed full understanding     Migraines- improved off the IVIG. Continue the topamax 25mg bid. Can continue the relpax for abortive tx.        (X) Discussed potential side effects of any treatment relevant to above.  Includes explanation of tests as necessary.     Return in about 4 months (around 11/8/2024).

## 2024-11-08 NOTE — TELEPHONE ENCOUNTER
Medication: Baclofen 10 mg     Date of last refill: 11/07/2023  [place]      Last office visit: 07/08/2024  Due back to clinic per last office note:    Date next office visit scheduled:    Future Appointments   Date Time Provider Department Center   11/18/2024 10:00 AM Bessy Nathan PA [facility]           Last OV note recommendation:    Discussion plus Diagnostics & Treatment Orders:     Patient is a 44 year old male here for follow-up of his MS. Since his last visit he completed a 5 day course of IVIG in May 2024 without improvement in his left lower extremity weakness and  bilateral lower extremity numbness. This was a worsening of a known symptom. Recommend proceeding with follow-up MRIs in October. Patient will call for MRI orders when ready to proceed. At this point this was first time symptom worsening since being on the kesimpta so would not consider this a failure at this point. If he has another flare-up or lesion progression on MRIs then may need to consider a switch in DMT.  He expressed full understanding     Migraines- improved off the IVIG. Continue the topamax 25mg bid. Can continue the relpax for abortive tx.        (X) Discussed potential side effects of any treatment relevant to above.  Includes explanation of tests as necessary.     Return in about 4 months (around 11/8/2024).

## 2024-11-25 ENCOUNTER — TELEPHONE (OUTPATIENT)
Dept: NEUROLOGY | Facility: CLINIC | Age: 44
End: 2024-11-25

## 2024-11-25 DIAGNOSIS — G35 MS (MULTIPLE SCLEROSIS) (HCC): Primary | ICD-10-CM

## 2024-11-25 NOTE — TELEPHONE ENCOUNTER
[facility] calling in regards to question on imaging order.    Discussed with provider, order for MRI of brain should be only MRI brain with/without contrast.

## 2024-12-02 ENCOUNTER — TELEPHONE (OUTPATIENT)
Dept: NEUROLOGY | Facility: CLINIC | Age: 44
End: 2024-12-02

## 2024-12-02 ENCOUNTER — OFFICE VISIT (OUTPATIENT)
Dept: NEUROLOGY | Facility: CLINIC | Age: 44
End: 2024-12-02
Payer: COMMERCIAL

## 2024-12-02 VITALS
WEIGHT: 159 LBS | BODY MASS INDEX: 24.96 KG/M2 | DIASTOLIC BLOOD PRESSURE: 82 MMHG | HEIGHT: 67 IN | HEART RATE: 105 BPM | RESPIRATION RATE: 16 BRPM | SYSTOLIC BLOOD PRESSURE: 120 MMHG

## 2024-12-02 DIAGNOSIS — G43.009 MIGRAINE WITHOUT AURA AND WITHOUT STATUS MIGRAINOSUS, NOT INTRACTABLE: ICD-10-CM

## 2024-12-02 DIAGNOSIS — G37.3 TRANSVERSE MYELITIS (HCC): ICD-10-CM

## 2024-12-02 DIAGNOSIS — G35 MS (MULTIPLE SCLEROSIS) (HCC): Primary | ICD-10-CM

## 2024-12-02 RX ORDER — RIMEGEPANT SULFATE 75 MG/75MG
TABLET, ORALLY DISINTEGRATING ORAL
Qty: 16 TABLET | Refills: 5 | Status: SHIPPED | OUTPATIENT
Start: 2024-12-02

## 2024-12-02 NOTE — TELEPHONE ENCOUNTER
Per Bessy: after office visit she reviewed his chart and noticed that cervical scan had not been performed since 2019. Test placed to [place].    Patient notified. He states he has already received text from [facility] [facility]. If he wishes to schedule at another location, he will let us know.

## 2024-12-02 NOTE — PROGRESS NOTES
NEUROLOGY  [facility]       Previous visit and existing record notes reviewed in preparation for the face to face visit.  Relevant labs and studies reviewed and will be noted in relevant areas of this record.    Yahs Alarcon  2/2/1980  Primary Care Provider:  No primary care provider on file.    12/2/2024  44 year old male      was last seen on: Patient was last seen 7/8/24 and at that time he had recently completed a course of IVIG without improvement in his lower extremity symptoms and was continued the Topamax for migraine prevention and relpax for abortive tx.    Seen for/plans last visit:  Transverse Myelitis  MS  Migraines    Accompanied visit: No    Present condition:  Currently getting migraines 3-4 times a week for the last couple of weeks. Typically the headache will start in  the middle of the night. Prior to these last few week was getting the migraines 1-2 times a week.  The relpax is effective and able to abort the headache with just one dose.  Has used excedrin which is effective for abortive tx. If it is more severe he will take excedrin as he will get relief sooner.   Feels like legs are weaker. Hard to stand for long periods of time  Has been having issues with constipation. He has been using metamucil which has not been helping  No vision complaints  No weakness of the upper extremities  No urinary symptoms currently      MRI Brain(11/25/24)    IMPRESSION:   1. Multiple sub-5 mm T2/FLAIR hyperintense, non-enhancing signal abnormalities in the supratentorial white matter bilaterally are unchanged compared to the prior MRI. Though non-specific in appearance, these white matter signal abnormalities are   compatible with the history of multiple sclerosis. No new or acute intracranial abnormalities are identified.     2. Redemonstrated paranasal sinus inflammatory changes which remain most pronounced in the maxillary sinuses.     MRI Thoracic Spine(11/25/24)    IMPRESSION:   1.  Persistent abnormal T2/STIR hyperintense signal within the dorsal aspect of the spinal cord at the T11-T12 disc level with decreased spinal cord volume at this level. This is similar to the prior study of 10/16/2023 with resolution of the previously   seen spinal cord enhancement. No new spinal cord lesions are identified.   2. Thoracic alignment remains normal with intact thoracic dual heights.   3. Stable tiny right central disc protrusion at T2-T3.  No new disc abnormalities are identified with no significant spinal stenosis or neural foraminal narrowing throughout the thoracic spine.       Review of Systems:  Review of Systems:  Denies systemic symptoms     No CP or SOB.  No GI or  symptoms. Relevant Neuro as noted above.    Past Medical History:    Multiple sclerosis (HCC)         Medications:      Current Outpatient Medications:     Rimegepant Sulfate (NURTEC) 75 MG Oral Tablet Dispersible, Take 1 tab po every other day, Disp: 16 tablet, Rfl: 5    baclofen 10 MG Oral Tab, Take 1 tablet (10 mg total) by mouth 2 (two) times daily., Disp: 180 tablet, Rfl: 3    gabapentin 300 MG Oral Cap, Take 1 capsule (300 mg total) by mouth 2 (two) times daily., Disp: 180 capsule, Rfl: 3    topiramate 25 MG Oral Tab, Take 1 tablet (25 mg total) by mouth 2 (two) times daily. (Patient taking differently: Take 1 tablet (25 mg total) by mouth daily.), Disp: 180 tablet, Rfl: 3    ondansetron (ZOFRAN) 4 mg tablet, Take 1 tablet (4 mg total) by mouth every 12 (twelve) hours as needed for Nausea (nausea related to migraine (can cause sleepiness))., Disp: 10 tablet, Rfl: 0    tiZANidine 2 MG Oral Tab, Take 1 tablet (2 mg total) by mouth every 8 (eight) hours as needed (for migraine (can cause lightheadedness))., Disp: 30 tablet, Rfl: 1    Eletriptan Hydrobromide 40 MG Oral Tab, use at onset; may repeat once after 4 hours- ONLY 2 IN 24 HOUR PERIOD MAX.  This is a 30 day supply., Disp: 16 tablet, Rfl: 5    Ofatumumab (KESIMPTA) 20  MG/0.4ML Subcutaneous Solution Auto-injector, Inject 20 mg into the skin every 28 days., Disp: 3 each, Rfl: 3    Tadalafil (CIALIS) 20 MG Oral Tab, Take 1 tablet (20 mg total) by mouth daily as needed for Erectile Dysfunction., Disp: 4 tablet, Rfl: 11    Incontinence Supplies (URINARY DRAINAGE BAG) Does not apply Misc, Please provide incontinence products for patient, including: external catheters, urine bags, Disp: 4 each, Rfl: 5    Incontinence Supply Disposable (PROCARE ADULT BRIEFS MEDIUM) Does not apply Misc, Please provide briefs and under pads, Disp: 25 each, Rfl: 2    Incontinence Supply Disposable (UNDERPADS EXTRA LARGE) Does not apply Misc, Please provide incontinence supplies, Disp: 20 each, Rfl: 2  PRN:     Allergies:  Allergies[1]       EXAM:  /82 (BP Location: Left arm, Patient Position: Sitting, Cuff Size: adult)   Pulse 105   Resp 16   Ht 67\"   Wt 159 lb (72.1 kg)   BMI 24.90 kg/m²   Looks stated age  General Exam:  HENT:  pink conjunctiva anicteric sclerae  Neck no adenopathy, thyroid normal  Heart and Lungs:  normal  Extremities: no cyanosis, skin changes    NEURO  NAD, A&Ox3  EOMI  CN II-XII intact  Strength 5/5 bilateral upper extremities  Strength 4+/5 right knee flexion/extension  Strength 4/5 right quadriceps  No movement of the quadriceps  No movement of the left on flexion/extension  Strength 2/5 right dorsiflexion  Drop foot on the left   DTRs 3+ throughout bilaterally  FTN intact bilaterally  No drift on exam  Patient circumducts the right leg when walking  Patient using bilateral canes    MRI Brain images were reviewed and compared to previous MRI Brain done in 2023 and it was stable. Lesion burden is very mild in the brain    MRI Thoracic Spine images were reviewed    Problem/s Identified this visit:   1. MS (multiple sclerosis) (HCC)    2. Transverse myelitis (HCC)    3. Migraine without aura and without status migrainosus, not intractable          Discussion plus Diagnostics  & Treatment Orders:    MS- Currently stable. Continue the kesimpta. Follow-up MRI Cervical Spine ordered    Migraines- Will have him discontinue the topamax. Will start him on nurtec for headache prevention. He can continue the relpax for abortive tx as it has been effective.    Constipation- Recommended he follow-up with pcp. In mean time can try making sure he is well hydrated. Can try prunes or prune juice. If no improvement with that then could try miralax. He expressed full understanding.    (X) Discussed potential side effects of any treatment relevant to above.  Includes explanation of tests as necessary.    Return in about 4 months (around 4/2/2025).      Patient understands that if needed, based on condition and or test results, follow up will be readjusted    Bessy Nathan PA-C  [facility]  12/2/2024, Time completed 9:38 AM             [1] No Known Allergies

## 2024-12-02 NOTE — PATIENT INSTRUCTIONS
Refill policies:    Allow 2-3 business days for refills; controlled substances may take longer.  Contact your pharmacy at least 5 days prior to running out of medication and have them send an electronic request or submit request through the “request refill” option in your [employer] account.  Refills are not addressed on weekends; covering physicians do not authorize routine medications on weekends.  No narcotics or controlled substances are refilled after noon on Fridays or by on call physicians.  By law, narcotics must be electronically prescribed.  A 30 day supply with no refills is the maximum allowed.  If your prescription is due for a refill, you may be due for a follow up appointment.  To best provide you care, patients receiving routine medications need to be seen at least once a year.  Patients receiving narcotic/controlled substance medications need to be seen at least once every 3 months.  In the event that your preferred pharmacy does not have the requested medication in stock (e.g. Backordered), it is your responsibility to find another pharmacy that has the requested medication available.  We will gladly send a new prescription to that pharmacy at your request.    Scheduling Tests:    If your physician has ordered radiology tests such as MRI or CT scans, please contact Central Scheduling at [phone] right away to schedule the test.  Once scheduled, the [facility] Centralized Referral Team will work with your insurance carrier to obtain pre-certification or prior authorization.  Depending on your insurance carrier, approval may take 3-10 days.  It is highly recommended patients assure they have received an authorization before having a test performed.  If test is done without insurance authorization, patient may be responsible for the entire amount billed.      Precertification and Prior Authorizations:  If your physician has recommended that you have a procedure or additional testing performed the [facility]  Centralized Referral Team will contact your insurance carrier to obtain pre-certification or prior authorization.    You are strongly encouraged to contact your insurance carrier to verify that your procedure/test has been approved and is a COVERED benefit.  Although the [facility] Centralized Referral Team does its due diligence, the insurance carrier gives the disclaimer that \"Although the procedure is authorized, this does not guarantee payment.\"    Ultimately the patient is responsible for payment.   Thank you for your understanding in this matter.  Paperwork Completion:  If you require FMLA or disability paperwork for your recovery, please make sure to either drop it off or have it faxed to our office at [phone]. Be sure the form has your name and date of birth on it.  The form will be faxed to our Forms Department and they will complete it for you.  There is a 25$ fee for all forms that need to be filled out.  Please be aware there is a 10-14 day turnaround time.  You will need to sign a release of information (LISA) form if your paperwork does not come with one.  You may call the Forms Department with any questions at [phone].  Their fax number is [phone].

## 2024-12-04 ENCOUNTER — TELEPHONE (OUTPATIENT)
Dept: NEUROLOGY | Facility: CLINIC | Age: 44
End: 2024-12-04

## 2024-12-04 NOTE — TELEPHONE ENCOUNTER
Received fax from [employer]   Approval received for patient use of Nurtec   Approval granted: 12/4/24-6/4/25        Pt notified

## 2025-02-13 DIAGNOSIS — G35 MS (MULTIPLE SCLEROSIS) (HCC): ICD-10-CM

## 2025-02-13 RX ORDER — OFATUMUMAB 20 MG/.4ML
120 INJECTION, SOLUTION SUBCUTANEOUS
Qty: 3 EACH | Refills: 3 | Status: SHIPPED | OUTPATIENT
Start: 2025-02-13

## 2025-02-13 NOTE — TELEPHONE ENCOUNTER
Medication: Kesimpta 20 mg     Date of last refill: 03/19/2024 with 3 addt refills  [place]      Last office visit: 12/02/2024  Due back to clinic per last office note:    Date next office visit scheduled:    Future Appointments   Date Time Provider Department Center   4/2/2025  9:40 AM Rojas Hutson MD [facility]           Last OV note recommendation:    Discussion plus Diagnostics & Treatment Orders:     MS- Currently stable. Continue the kesimpta. Follow-up MRI Cervical Spine ordered     Migraines- Will have him discontinue the topamax. Will start him on nurtec for headache prevention. He can continue the relpax for abortive tx as it has been effective.     Constipation- Recommended he follow-up with pcp. In mean time can try making sure he is well hydrated. Can try prunes or prune juice. If no improvement with that then could try miralax. He expressed full understanding.     (X) Discussed potential side effects of any treatment relevant to above.  Includes explanation of tests as necessary.     Return in about 4 months (around 4/2/2025).

## 2025-02-25 ENCOUNTER — OFFICE VISIT (OUTPATIENT)
Dept: FAMILY MEDICINE CLINIC | Facility: CLINIC | Age: 45
End: 2025-02-25
Payer: COMMERCIAL

## 2025-02-25 VITALS
HEART RATE: 110 BPM | SYSTOLIC BLOOD PRESSURE: 124 MMHG | RESPIRATION RATE: 16 BRPM | OXYGEN SATURATION: 98 % | DIASTOLIC BLOOD PRESSURE: 88 MMHG | HEIGHT: 67 IN | BODY MASS INDEX: 26.37 KG/M2 | WEIGHT: 168 LBS

## 2025-02-25 DIAGNOSIS — Z87.440 HISTORY OF RECURRENT UTIS: ICD-10-CM

## 2025-02-25 DIAGNOSIS — H35.712 CENTRAL SEROUS CHORIORETINOPATHY OF LEFT EYE: ICD-10-CM

## 2025-02-25 DIAGNOSIS — M79.645 FINGER PAIN, LEFT: ICD-10-CM

## 2025-02-25 DIAGNOSIS — R39.9 UTI SYMPTOMS: ICD-10-CM

## 2025-02-25 DIAGNOSIS — G35 MS (MULTIPLE SCLEROSIS) (HCC): Primary | ICD-10-CM

## 2025-02-25 LAB
APPEARANCE: CLEAR
BILIRUBIN: NEGATIVE
GLUCOSE (URINE DIPSTICK): NEGATIVE MG/DL
KETONES (URINE DIPSTICK): NEGATIVE MG/DL
LEUKOCYTES: NEGATIVE
MULTISTIX LOT#: NORMAL NUMERIC
NITRITE, URINE: NEGATIVE
OCCULT BLOOD: NEGATIVE
PH, URINE: 7 (ref 4.5–8)
PROTEIN (URINE DIPSTICK): NEGATIVE MG/DL
SPECIFIC GRAVITY: 1.01 (ref 1–1.03)
URINE-COLOR: YELLOW
UROBILINOGEN,SEMI-QN: 0.2 MG/DL (ref 0–1.9)

## 2025-02-25 PROCEDURE — 81003 URINALYSIS AUTO W/O SCOPE: CPT

## 2025-02-25 PROCEDURE — 99214 OFFICE O/P EST MOD 30 MIN: CPT

## 2025-02-25 PROCEDURE — 3079F DIAST BP 80-89 MM HG: CPT

## 2025-02-25 PROCEDURE — 3074F SYST BP LT 130 MM HG: CPT

## 2025-02-25 PROCEDURE — 3008F BODY MASS INDEX DOCD: CPT

## 2025-02-25 RX ORDER — NITROFURANTOIN 25; 75 MG/1; MG/1
100 CAPSULE ORAL 2 TIMES DAILY
Qty: 14 CAPSULE | Refills: 0 | Status: SHIPPED | OUTPATIENT
Start: 2025-02-25 | End: 2025-03-04

## 2025-02-25 NOTE — PROGRESS NOTES
[facility] Office Note  Chief Complaint:   Chief Complaint   Patient presents with    Urgent Care F/u     1/21/25 and 2/1/25 emidiate cure was dx with a UTI, pt is still having symptoms    Finger Pain     X1 month, pain in left index finger        HPI:   Yash Alarcon is a 45 year old male coming in for ongoing UTI symptoms. He is a new patient to our practice. He has a past medical hx of multiple sclerosis and central serous chorioretinopathy.  He has not chosen a physician in our practice yet to establish care with yet but plans to do so. He is accompanied by his friend Dane today.  States he has has been having recurrent UTIs and believes he has another uti today. He previously went to an immediate care on 1/21/25 where an UA indicated an UTI and Augmentin 500-235 tab was prescribed. He then went to an immediate care on 2/1/25 again due to the UTI symptoms not resolving and was prescribed cefdinir 300mg cap.    Reports he had previous UTI in August 2024 and it came back in September 2024 as well. He states that in September, the UA at the time came back negative, but he had relief of symptoms after finishing an antibiotic that was prescribed for his symptoms at the time.    Today, his symptoms include some pain when he urinates, bladder fullness, and decreased urinary frequency. He reports he always has numbness in his legs from his MS.  Denies color changes of his urine. Denies any pelvic or flank pain.    States that he uses an external catheter at night and when he cleans it, the urine appears to be a little cloudy. However, the degree of cloudiness does vary day to day.    Does not wear external catheter during the day. Wears briefs.    Denies any new partners or being sexually active recently. Is .    He is also having left index finger pain. 1st weke of January it stared. Hurts more with flexion. No injury he could think of that has caused it.      Past Medical History:     Multiple sclerosis (HCC)     Past Surgical History:   Procedure Laterality Date    Other  07/2020    Laser procedure on left eye    Us testicles (cpt=76870)      Left testicle     Social History:  Social History     Socioeconomic History    Marital status:    Tobacco Use    Smoking status: Never     Passive exposure: Never    Smokeless tobacco: Never   Vaping Use    Vaping status: Never Used   Substance and Sexual Activity    Alcohol use: Never    Drug use: No   Other Topics Concern    Caffeine Concern Yes     Comment: 1 cup occasionally    Exercise Yes     Comment: occasionally     Family History:  Family History   Problem Relation Age of Onset    Diabetes Mother     Other (emphysema) Paternal Grandmother      Allergies:  No Known Allergies  Current Meds:  Current Outpatient Medications   Medication Sig Dispense Refill    nitrofurantoin monohydrate macro 100 MG Oral Cap Take 1 capsule (100 mg total) by mouth 2 (two) times daily for 7 days. 14 capsule 0    KESIMPTA 20 MG/0.4ML Subcutaneous Solution Auto-injector INJECT 1 PEN SUBCUTANEOUSLY EVERY 28 DAYS 3 each 3    Rimegepant Sulfate (NURTEC) 75 MG Oral Tablet Dispersible Take 1 tab po every other day 16 tablet 5    baclofen 10 MG Oral Tab Take 1 tablet (10 mg total) by mouth 2 (two) times daily. 180 tablet 3    gabapentin 300 MG Oral Cap Take 1 capsule (300 mg total) by mouth 2 (two) times daily. 180 capsule 3    topiramate 25 MG Oral Tab Take 1 tablet (25 mg total) by mouth 2 (two) times daily. (Patient taking differently: Take 1 tablet (25 mg total) by mouth daily.) 180 tablet 3    ondansetron (ZOFRAN) 4 mg tablet Take 1 tablet (4 mg total) by mouth every 12 (twelve) hours as needed for Nausea (nausea related to migraine (can cause sleepiness)). 10 tablet 0    tiZANidine 2 MG Oral Tab Take 1 tablet (2 mg total) by mouth every 8 (eight) hours as needed (for migraine (can cause lightheadedness)). 30 tablet 1    Eletriptan Hydrobromide 40 MG Oral Tab use  at onset; may repeat once after 4 hours- ONLY 2 IN 24 HOUR PERIOD MAX.  This is a 30 day supply. 16 tablet 5    Tadalafil (CIALIS) 20 MG Oral Tab Take 1 tablet (20 mg total) by mouth daily as needed for Erectile Dysfunction. 4 tablet 11    Incontinence Supplies (URINARY DRAINAGE BAG) Does not apply Misc Please provide incontinence products for patient, including: external catheters, urine bags 4 each 5    Incontinence Supply Disposable (PROCARE ADULT BRIEFS MEDIUM) Does not apply Misc Please provide briefs and under pads 25 each 2    Incontinence Supply Disposable (UNDERPADS EXTRA LARGE) Does not apply Misc Please provide incontinence supplies 20 each 2      Counseling given: Not Answered       REVIEW OF SYSTEMS:   Review of Systems   Constitutional:  Negative for chills and fever.   Gastrointestinal:  Negative for abdominal pain.   Genitourinary:  Positive for decreased urine volume and dysuria. Negative for flank pain, frequency, hematuria, penile discharge, penile pain, testicular pain and urgency.   Musculoskeletal:  Positive for joint swelling.   Skin:  Negative for color change and wound.        EXAM:   /88   Pulse 110   Resp 16   Ht 5' 7\" (1.702 m)   Wt 168 lb (76.2 kg)   SpO2 98%   BMI 26.31 kg/m²  Estimated body mass index is 26.31 kg/m² as calculated from the following:    Height as of this encounter: 5' 7\" (1.702 m).    Weight as of this encounter: 168 lb (76.2 kg).   Vital signs reviewed.Appears stated age, well groomed.  Physical Exam  Constitutional:       Appearance: Normal appearance.   HENT:      Head: Normocephalic and atraumatic.      Nose: Nose normal. No congestion or rhinorrhea.      Mouth/Throat:      Mouth: Mucous membranes are moist.      Pharynx: Oropharynx is clear.   Eyes:      Extraocular Movements: Extraocular movements intact.      Conjunctiva/sclera: Conjunctivae normal.      Pupils: Pupils are equal, round, and reactive to light.   Cardiovascular:      Rate and Rhythm:  Normal rate and regular rhythm.   Pulmonary:      Effort: No respiratory distress.      Breath sounds: Normal breath sounds. No stridor. No wheezing, rhonchi or rales.   Chest:      Chest wall: No tenderness.   Abdominal:      General: Abdomen is flat. Bowel sounds are normal. There is no distension.      Palpations: Abdomen is soft. There is no mass.      Tenderness: There is no abdominal tenderness. There is no guarding.   Musculoskeletal:      Comments: Utilizing forearm crutches to walk   Skin:     General: Skin is warm and dry.   Neurological:      Mental Status: He is alert and oriented to person, place, and time.   Psychiatric:         Mood and Affect: Mood normal.         Behavior: Behavior normal.         Thought Content: Thought content normal.         Judgment: Judgment normal.        Recent Results (from the past 72 hours)   URINALYSIS, AUTO, W/O SCOPE    Collection Time: 02/25/25 10:59 AM   Result Value Ref Range    Glucose Urine Negative Negative mg/dL    Bilirubin Urine Negative Negative    Ketones, UA Negative Negative - Trace mg/dL    Spec Gravity 1.015 1.005 - 1.030    Blood Urine Negative Negative    PH Urine 7.0 5.0 - 8.0    Protein Urine Negative Negative - Trace mg/dL    Urobilinogen Urine 0.2 0.2 - 1.0 mg/dL    Nitrite Urine Negative Negative    Leukocyte Esterase Urine Negative Negative    APPEARANCE clear Clear    Color Urine yellow Yellow    Multistix Lot# [id number] Numeric    Multistix Expiration Date 7/31/25 Date       ASSESSMENT AND PLAN:   1. UTI symptoms  Yash Alarcon is presenting with symptoms that are consistent with a urinary tract infection. There is no evidence for pyelonephritis, nephrolithiasis, traumatic injury based on the physical exam performed during today's visit.  An urine analysis done today. It was negative for any leukocytes or esterases but will start antibiotic empirically and send out for urine culture to identify if bacteria is causing symptoms.. STD testing was  not obtained today at the patient's request.    I advised to follow-up if symptoms continue.  I recommended that goes to the ER if .Yash Alarcon has concerns of uncontrolled pain, high fever that is not able to be break to Tylenol, flank pain or any other urinary concerns.  - URINALYSIS, AUTO, W/O SCOPE  - Urine Culture, Routine [E]; Future  - nitrofurantoin monohydrate macro 100 MG Oral Cap; Take 1 capsule (100 mg total) by mouth 2 (two) times daily for 7 days.  Dispense: 14 capsule; Refill: 0    2. History of recurrent UTIs  Urology referral provided to assess why recurrent uti symptoms keep occurring  - Urology Referral - In Network    3. Finger pain, left  Xray ordered to evaluate since its been gong on since beginning of January. Will order ortho referral or pt depending on xray results  - XR FINGER(S) (MIN 2 VIEWS), LEFT 2ND (CPT=73140); Future    4. MS (multiple sclerosis) ([facility])  Discussed physcal therapy options since Yash Alarcon states he is looking for more long-term physical therapy options and would like to start sooner but is concerned his current bladder symptoms may interrupt physical therapy sessions. He would like to get the bladder symptoms addressed before starting physical therapy.    5. Central serous chorioretinopathy of left eye  Yash Alarcon states that he needs a new eye doctor so a referral was provided.  - OPHTHALMOLOGY - INTERNAL          Meds & Refills for this Visit:  Requested Prescriptions     Signed Prescriptions Disp Refills    nitrofurantoin monohydrate macro 100 MG Oral Cap 14 capsule 0     Sig: Take 1 capsule (100 mg total) by mouth 2 (two) times daily for 7 days.       Health Maintenance.nce:  Health Maintenance Due   Topic Date Due    Annual Physical  Never done    DTaP,Tdap,and Td Vaccines (1 - Tdap) Never done    COVID-19 Vaccine (5 - 2024-25 season) 09/01/2024    Influenza Vaccine (1) 10/01/2024    Annual Depression Screening  Never done          Patient/Caregiver Education: Patient/Caregiver Education: There are no barriers to learning. Medical education done.   Outcome: Yash Alarcon verbalizes understanding. Yash Alarcon  is notified to call with any questions, complications, allergies, or worsening or changing symptoms.  Yash Alarcon  is to call with any side effects or complications from the treatments as a result of today.     Problem List:  Patient Active Problem List   Diagnosis    MS (multiple sclerosis) (HCC)    Central serous chorioretinopathy       Return if symptoms worsen or fail to improve, for Call back to schedule annual physical once you choose a physician of our practice.

## 2025-02-25 NOTE — PATIENT INSTRUCTIONS
[facility] for long-term physical therapy  [email or web address]     Call back to schedule annual physical once you choose a physician of our practice

## 2025-03-12 ENCOUNTER — TELEPHONE (OUTPATIENT)
Dept: NEUROLOGY | Facility: CLINIC | Age: 45
End: 2025-03-12

## 2025-03-12 NOTE — TELEPHONE ENCOUNTER
Spoke to patient who is asking if he can wait to have cervical MRI, that was ordered for him in December, along with his other yearly MRI's that he completes in October/November.  He is also asking if he should have a lumber MRI as he has not had one since 2022.  He also states that he will look for an MRI location closer to his home as [facility] is too far from his home.  He reports being claustrophobic and would like to try to have his MRI's completed at the same time.  He is offered medication for the claustrophobia and he declines at this time.      Routing to provider for review.

## 2025-03-12 NOTE — TELEPHONE ENCOUNTER
He currently just has an order for MRI Cervical Spine as he completed MRI Brain and Thoracic in November 2024. Is there a reason he wants to do the MRI Lumbar Spine as MS lesions are rarely seen in the Lumbar Spine.

## 2025-03-12 NOTE — TELEPHONE ENCOUNTER
Per Bessy, patient does not need a lumber spine MRI at this time unless the patient is experiencing issues.  Per Bessy, patient should have cervical MRI prior to seeing Dr. Hutson on 4/2/25.  Information relayed in a [employer] message to patient.

## 2025-03-26 ENCOUNTER — TELEPHONE (OUTPATIENT)
Dept: NEUROLOGY | Facility: CLINIC | Age: 45
End: 2025-03-26

## 2025-03-26 NOTE — TELEPHONE ENCOUNTER
Prior authorization requested for continued use of Kesimpta injections.    Clinical questions answered. Awaiting authorization from insurance.      Kesimpta approved 3/26/2025 - 3/26/2026.

## 2025-03-29 ENCOUNTER — HOSPITAL ENCOUNTER (OUTPATIENT)
Dept: GENERAL RADIOLOGY | Age: 45
Discharge: HOME | End: 2025-03-29
Payer: COMMERCIAL

## 2025-03-29 DIAGNOSIS — M79.645 FINGER PAIN, LEFT: ICD-10-CM

## 2025-03-29 PROCEDURE — 73140 X-RAY EXAM OF FINGER(S): CPT

## 2025-04-02 ENCOUNTER — OFFICE VISIT (OUTPATIENT)
Dept: NEUROLOGY | Facility: CLINIC | Age: 45
End: 2025-04-02
Payer: COMMERCIAL

## 2025-04-02 ENCOUNTER — TELEPHONE (OUTPATIENT)
Dept: NEUROLOGY | Facility: CLINIC | Age: 45
End: 2025-04-02

## 2025-04-02 VITALS
WEIGHT: 168 LBS | SYSTOLIC BLOOD PRESSURE: 131 MMHG | DIASTOLIC BLOOD PRESSURE: 88 MMHG | HEIGHT: 67 IN | HEART RATE: 78 BPM | RESPIRATION RATE: 16 BRPM | BODY MASS INDEX: 26.37 KG/M2

## 2025-04-02 DIAGNOSIS — G43.009 MIGRAINE WITHOUT AURA AND WITHOUT STATUS MIGRAINOSUS, NOT INTRACTABLE: ICD-10-CM

## 2025-04-02 DIAGNOSIS — R25.2 SPASTICITY: ICD-10-CM

## 2025-04-02 DIAGNOSIS — G35 MS (MULTIPLE SCLEROSIS) (HCC): Primary | ICD-10-CM

## 2025-04-02 DIAGNOSIS — G37.3 TRANSVERSE MYELITIS (HCC): ICD-10-CM

## 2025-04-02 DIAGNOSIS — H35.713 CENTRAL SEROUS CHORIORETINOPATHY OF BOTH EYES: ICD-10-CM

## 2025-04-02 PROCEDURE — 3008F BODY MASS INDEX DOCD: CPT | Performed by: OTHER

## 2025-04-02 PROCEDURE — 3079F DIAST BP 80-89 MM HG: CPT | Performed by: OTHER

## 2025-04-02 PROCEDURE — 3075F SYST BP GE 130 - 139MM HG: CPT | Performed by: OTHER

## 2025-04-02 PROCEDURE — 99214 OFFICE O/P EST MOD 30 MIN: CPT | Performed by: OTHER

## 2025-04-02 RX ORDER — FREMANEZUMAB-VFRM 225 MG/1.5ML
225 INJECTION SUBCUTANEOUS
Qty: 1 EACH | Refills: 0 | Status: SHIPPED | OUTPATIENT
Start: 2025-04-02

## 2025-04-02 RX ORDER — BACLOFEN 10 MG/1
10 TABLET ORAL 3 TIMES DAILY
Qty: 270 TABLET | Refills: 3 | Status: SHIPPED | OUTPATIENT
Start: 2025-04-02

## 2025-04-02 RX ORDER — TADALAFIL 20 MG/1
20 TABLET ORAL
Qty: 4 TABLET | Refills: 11 | Status: SHIPPED | OUTPATIENT
Start: 2025-04-02

## 2025-04-02 RX ORDER — FREMANEZUMAB-VFRM 225 MG/1.5ML
225 INJECTION SUBCUTANEOUS
Qty: 1 EACH | Refills: 0 | COMMUNITY
Start: 2025-04-02

## 2025-04-02 RX ORDER — MELOXICAM 15 MG/1
15 TABLET ORAL DAILY
Qty: 30 TABLET | Refills: 5 | Status: SHIPPED | OUTPATIENT
Start: 2025-04-02

## 2025-04-02 RX ORDER — SOLIFENACIN SUCCINATE 10 MG/1
10 TABLET, FILM COATED ORAL DAILY
Qty: 30 TABLET | Refills: 5 | Status: SHIPPED | OUTPATIENT
Start: 2025-04-02

## 2025-04-02 RX ORDER — TOPIRAMATE 25 MG/1
25 TABLET, FILM COATED ORAL DAILY
COMMUNITY
End: 2025-04-02

## 2025-04-02 NOTE — PROGRESS NOTES
NEUROLOGY  [facility]       Yash Alarcon  2/2/1980  Primary Care Provider:  No primary care provider on file.    4/2/2025  45 year old yo,  was last seen on:: December    Seen for/plans last visit:  MS and other problems    Previous visit and existing record notes reviewed in preparation for the face to face visit.  Relevant labs and studies reviewed and will be noted in relevant areas of this record.  Accompanied visit:     (x) No.      Present condition:  He was put on Nurtec every other day as a preventative.  Independently when he is having a headache and happens to be an Nurtec day, Nurtec does not get rid of the headache.  He responds better to eletriptan as an abortive treatment.  Prior to the Nurtec, Topamax was being used however he continued to have headaches as well.  There are times when the pain starts as is tightness in the back of the head and neck and escalates into a full-blown headache and is already times that are not likely to respond to Nurtec      Lately has been complaining of joint pains in the knee and also in finger joints.  They feel swollen.  Was also seen for pain in the right lower quadrant of the abdomen and was given meloxicam.    Spasticity problems in legs. Stiff when moving and noticeable during the daytime    Bladder control. Has had UTI x2 in a year.  He gets urgency and has to quickly get to [facility].  Gets incontinent    Past History update/new problem(s): as above    Review of Systems:  Review of Systems:  Denies systemic symptoms     No CP or SOB.  No GI or  symptoms. Relevant Neuro as noted above.      Medications:    Please: Patient does not take tizanidine and has not taken it in a while.  The Topamax was stopped in December after he was put on Nurtec but the Nurtec is not being used as a preventative.      Current Outpatient Medications:     baclofen 10 MG Oral Tab, Take 1 tablet (10 mg total) by mouth 3 (three) times daily., Disp: 270 tablet, Rfl: 3     Solifenacin Succinate 10 MG Oral Tab, Take 1 tablet (10 mg total) by mouth daily., Disp: 30 tablet, Rfl: 5    Meloxicam 15 MG Oral Tab, Take 1 tablet (15 mg total) by mouth daily., Disp: 30 tablet, Rfl: 5    Fremanezumab-vfrm (AJOVY) 225 MG/1.5ML Subcutaneous Solution Auto-injector, Inject 225 mg into the skin every 30 (thirty) days., Disp: 1 each, Rfl: 0    Tadalafil (CIALIS) 20 MG Oral Tab, Take 1 tablet (20 mg total) by mouth daily as needed for Erectile Dysfunction., Disp: 4 tablet, Rfl: 11    Fremanezumab-vfrm (AJOVY) 225 MG/1.5ML Subcutaneous Solution Auto-injector, Inject 225 mg into the skin every 30 (thirty) days., Disp: 1 each, Rfl: 0    KESIMPTA 20 MG/0.4ML Subcutaneous Solution Auto-injector, INJECT 1 PEN SUBCUTANEOUSLY EVERY 28 DAYS, Disp: 3 each, Rfl: 3    Rimegepant Sulfate (NURTEC) 75 MG Oral Tablet Dispersible, Take 1 tab po every other day, Disp: 16 tablet, Rfl: 5    gabapentin 300 MG Oral Cap, Take 1 capsule (300 mg total) by mouth 2 (two) times daily., Disp: 180 capsule, Rfl: 3    ondansetron (ZOFRAN) 4 mg tablet, Take 1 tablet (4 mg total) by mouth every 12 (twelve) hours as needed for Nausea (nausea related to migraine (can cause sleepiness))., Disp: 10 tablet, Rfl: 0    Eletriptan Hydrobromide 40 MG Oral Tab, use at onset; may repeat once after 4 hours- ONLY 2 IN 24 HOUR PERIOD MAX.  This is a 30 day supply., Disp: 16 tablet, Rfl: 5    Incontinence Supplies (URINARY DRAINAGE BAG) Does not apply Misc, Please provide incontinence products for patient, including: external catheters, urine bags, Disp: 4 each, Rfl: 5    Incontinence Supply Disposable (PROCARE ADULT BRIEFS MEDIUM) Does not apply Misc, Please provide briefs and under pads, Disp: 25 each, Rfl: 2    Incontinence Supply Disposable (UNDERPADS EXTRA LARGE) Does not apply Misc, Please provide incontinence supplies, Disp: 20 each, Rfl: 2  PRN:     Allergies:  Allergies[1]       EXAM:  /88 (BP Location: Left arm, Patient Position:  Sitting, Cuff Size: large)   Pulse 78   Resp 16   Ht 67\"   Wt 168 lb (76.2 kg)   BMI 26.31 kg/m²   Looks stated age  General Exam:  HENT:  pink conjunctiva anicteric sclerae  Neck no adenopathy, thyroid normal  Heart and Lungs:  normal  Extremities: no cyanosis, skin changes    NEURO  Spastic paraparesis  Walks using bilateral cane  Numb from waist down        INTERPRETATION of RELEVANT LABS and other DATA:    MRI THORACIC cord in 2024  IMPRESSION:   1. Persistent abnormal T2/STIR hyperintense signal within the dorsal aspect of the spinal cord at the T11-T12 disc level with decreased spinal cord volume at this level. This is similar to the prior study of 10/16/2023 with resolution of the previously   seen spinal cord enhancement. No new spinal cord lesions are identified.     MRI CERVICAL CORD 2025  IMPRESSION:     Cervical cord, and the visualized portions of the brainstem, cerebellar hemispheres, and the upper thoracic cord are normal in signal intensity with no evidence of abnormal enhancement.     BRAIN 2024  IMPRESSION:   1. Multiple sub-5 mm T2/FLAIR hyperintense, non-enhancing signal abnormalities in the supratentorial white matter bilaterally are unchanged compared to the prior MRI. Though non-specific in appearance, these white matter signal abnormalities are   compatible with the history of multiple sclerosis. No new or acute intracranial abnormalities are identified.     Problem/s Identified this visit:   1. MS (multiple sclerosis) (HCC)    2. Transverse myelitis (HCC)    3. Spasticity    4. Migraine without aura and without status migrainosus, not intractable    5. Central serous chorioretinopathy of both eyes          Discussion plus Diagnostics & Treatment Orders:  Try AJOVY instead of Nurtec and if not better after 3 months switch to trying Botox for the headache    For the leg spasticity increase Baclofen to 10 mg TID and refer to Dr Morse of Physiatry for assistance in dealing with the  therapy needs and other adjunctive treatment for the leg spasticity    Bladder, try Vesicare    Keep on Kesimpta but get biomarkers to assess disease activity    Increase Meloxicam to 15 mg daily prn for the joint pains     Procedures    MISCELLANEOUS LAB TESTING [[facility] ONLY]    MISCELLANEOUS LAB TESTING [[facility] ONLY]    Immunoglobulin free LT chains blood    Sed Rate, Westergren (Automated)    Rheumatoid Arthritis Factor    Physiatry Referral - In Network     Orders Placed This Encounter    MISCELLANEOUS LAB TESTING [[facility] ONLY]     Glial fibrillary Acidic Protein     Standing Status:   Future     Standing Expiration Date:   4/2/2026     Order Specific Question:   Release to patient     Answer:   Immediate    MISCELLANEOUS LAB TESTING [[facility] ONLY]     Neurofilament light chain     Standing Status:   Future     Standing Expiration Date:   4/2/2026     Order Specific Question:   Release to patient     Answer:   Immediate    Immunoglobulin free LT chains blood     Standing Status:   Future     Standing Expiration Date:   4/2/2026     Order Specific Question:   Release to patient     Answer:   Immediate    Sed Rate, Westergren (Automated)     Standing Status:   Future     Standing Expiration Date:   4/2/2026    Rheumatoid Arthritis Factor     Standing Status:   Future     Standing Expiration Date:   4/2/2026    DISCONTD: topiramate 25 MG Oral Tab     Sig: Take 1 tablet (25 mg total) by mouth daily.    baclofen 10 MG Oral Tab     Sig: Take 1 tablet (10 mg total) by mouth 3 (three) times daily.     Dispense:  270 tablet     Refill:  3    Solifenacin Succinate 10 MG Oral Tab     Sig: Take 1 tablet (10 mg total) by mouth daily.     Dispense:  30 tablet     Refill:  5    Meloxicam 15 MG Oral Tab     Sig: Take 1 tablet (15 mg total) by mouth daily.     Dispense:  30 tablet     Refill:  5    Fremanezumab-vfrm (AJOVY) 225 MG/1.5ML Subcutaneous Solution Auto-injector     Sig: Inject 225 mg into the skin every  30 (thirty) days.     Dispense:  1 each     Refill:  0    Tadalafil (CIALIS) 20 MG Oral Tab     Sig: Take 1 tablet (20 mg total) by mouth daily as needed for Erectile Dysfunction.     Dispense:  4 tablet     Refill:  11    Fremanezumab-vfrm (AJOVY) 225 MG/1.5ML Subcutaneous Solution Auto-injector     Sig: Inject 225 mg into the skin every 30 (thirty) days.     Dispense:  1 each     Refill:  0     Order Specific Question:   Lot Number?     Answer:   [id number]     Order Specific Question:   Expiration Date?     Answer:   6/30/2025           (x) Discussed potential side effects of any treatment relevant to above.  Includes explanation of tests as necessary.    No follow-ups on file.      Patient understands that if needed, based on condition and or test results, follow up will be readjusted      Rojas Hutson MD  Vascular & General Neurology  Director, Multiple Sclerosis Program  [facility]  4/2/2025, Time completed 9:49 AM    Decision making:  ( x ) labs reviewed/ordered - 1  (  ) new diagnosis: - 1  ( x) Images & studies independently reviewed -non F2F  (  ) Case/studies discussed with other caregivers - -non F2F  (  ) Telephone time with patiern or authorized Fam member--non F2F  ( x ) other records reviewed --non F2F including consultations  (  ) [facility] meetings - patient not present --non F2F  (  ) Independent Historian obtained    Non Face to Face CPT code 67899/32933 applies as documented above    PROCEDURE DONE     (   ) see notes        After visit, patient was escorted out and handed-off discharge process and instructions to the check out desk.  No additional issues relevant to visit were raised to staff at this time interval.        This document is to be interpreted as my current opinion regarding the case as of the stated date of service based on the information available to me at this time and may supersedes any prior opinion expressed either orally or in writing.  Services rendered are  only within the scope of direct medical care  Sometimes, reports may have been prepared partially using a speech recognition software technology.  If a word or phrase is confusing or out of context, please do not hesitate to call for clarification.              [1] No Known Allergies

## 2025-04-02 NOTE — PROGRESS NOTES
Patient given sample of AJOVY during office visit and educated on medication and administration.  Patient administered to self without incident.  All questions answered.    Savings card provided to patient.

## 2025-04-02 NOTE — TELEPHONE ENCOUNTER
Patient completed handicapped placard renewal form and presented it in his office appointment with Dr. Hutson today.  Provider reviewed the form and signed.  Patient took original signed form with him and a copy was sent to scan.

## 2025-04-02 NOTE — PATIENT INSTRUCTIONS
Refill policies:    Allow 2-3 business days for refills; controlled substances may take longer.  Contact your pharmacy at least 5 days prior to running out of medication and have them send an electronic request or submit request through the “request refill” option in your [employer] account.  Refills are not addressed on weekends; covering physicians do not authorize routine medications on weekends.  No narcotics or controlled substances are refilled after noon on Fridays or by on call physicians.  By law, narcotics must be electronically prescribed.  A 30 day supply with no refills is the maximum allowed.  If your prescription is due for a refill, you may be due for a follow up appointment.  To best provide you care, patients receiving routine medications need to be seen at least once a year.  Patients receiving narcotic/controlled substance medications need to be seen at least once every 3 months.  In the event that your preferred pharmacy does not have the requested medication in stock (e.g. Backordered), it is your responsibility to find another pharmacy that has the requested medication available.  We will gladly send a new prescription to that pharmacy at your request.    Scheduling Tests:    If your physician has ordered radiology tests such as MRI or CT scans, please contact Central Scheduling at [phone] right away to schedule the test.  Once scheduled, the [facility] Centralized Referral Team will work with your insurance carrier to obtain pre-certification or prior authorization.  Depending on your insurance carrier, approval may take 3-10 days.  It is highly recommended patients assure they have received an authorization before having a test performed.  If test is done without insurance authorization, patient may be responsible for the entire amount billed.      Precertification and Prior Authorizations:  If your physician has recommended that you have a procedure or additional testing performed the [facility]  Centralized Referral Team will contact your insurance carrier to obtain pre-certification or prior authorization.    You are strongly encouraged to contact your insurance carrier to verify that your procedure/test has been approved and is a COVERED benefit.  Although the [facility] Centralized Referral Team does its due diligence, the insurance carrier gives the disclaimer that \"Although the procedure is authorized, this does not guarantee payment.\"    Ultimately the patient is responsible for payment.   Thank you for your understanding in this matter.  Paperwork Completion:  If you require FMLA or disability paperwork for your recovery, please make sure to either drop it off or have it faxed to our office at [phone]. Be sure the form has your name and date of birth on it.  The form will be faxed to our Forms Department and they will complete it for you.  There is a 25$ fee for all forms that need to be filled out.  Please be aware there is a 10-14 day turnaround time.  You will need to sign a release of information (LISA) form if your paperwork does not come with one.  You may call the Forms Department with any questions at [phone].  Their fax number is [phone].

## 2025-04-08 ENCOUNTER — LAB ENCOUNTER (OUTPATIENT)
Dept: LAB | Age: 45
End: 2025-04-08
Attending: Other
Payer: COMMERCIAL

## 2025-04-08 DIAGNOSIS — G37.3 TRANSVERSE MYELITIS (HCC): ICD-10-CM

## 2025-04-08 DIAGNOSIS — G35 MS (MULTIPLE SCLEROSIS) (HCC): ICD-10-CM

## 2025-04-08 LAB — ERYTHROCYTE [SEDIMENTATION RATE] IN BLOOD: 14 MM/HR

## 2025-04-08 PROCEDURE — 86431 RHEUMATOID FACTOR QUANT: CPT | Performed by: OTHER

## 2025-04-08 PROCEDURE — 83521 IG LIGHT CHAINS FREE EACH: CPT | Performed by: OTHER

## 2025-04-08 PROCEDURE — 85652 RBC SED RATE AUTOMATED: CPT | Performed by: OTHER

## 2025-04-09 LAB — RHEUMATOID FACT SERPL-ACNC: 4.6 IU/ML (ref ?–14)

## 2025-04-10 LAB
KAPPA LC FREE SER-MCNC: 2.31 MG/DL (ref 0.33–1.94)
KAPPA LC FREE/LAMBDA FREE SER NEPH: 1.61 {RATIO} (ref 0.26–1.65)
LAMBDA LC FREE SERPL-MCNC: 1.44 MG/DL (ref 0.57–2.63)

## 2025-04-10 NOTE — PROGRESS NOTES
Nghia    The [place] free light chain is elevated and this is a nonspecific marker that inflammation and immune response is still ongoing despite the use of Kesimpta.  The Rheumatoid and ESR were normal.  Whether this also explains your joint pains is uncertain.  Waiting for the other special tests ordered.    Dr. ROSIE Hutson

## 2025-04-23 ENCOUNTER — PATIENT MESSAGE (OUTPATIENT)
Dept: NEUROLOGY | Facility: CLINIC | Age: 45
End: 2025-04-23

## 2025-04-30 DIAGNOSIS — R25.2 SPASTICITY: ICD-10-CM

## 2025-04-30 DIAGNOSIS — G43.009 MIGRAINE WITHOUT AURA AND WITHOUT STATUS MIGRAINOSUS, NOT INTRACTABLE: ICD-10-CM

## 2025-04-30 DIAGNOSIS — G37.3 TRANSVERSE MYELITIS (HCC): ICD-10-CM

## 2025-04-30 DIAGNOSIS — G35 MS (MULTIPLE SCLEROSIS) (HCC): ICD-10-CM

## 2025-04-30 RX ORDER — FREMANEZUMAB-VFRM 225 MG/1.5ML
INJECTION SUBCUTANEOUS
Qty: 2 ML | Refills: 1 | Status: SHIPPED | OUTPATIENT
Start: 2025-04-30

## 2025-04-30 NOTE — TELEPHONE ENCOUNTER
Medication: AJOVY 225 MG/1.5ML      Date of last refill: 4/2/25 (#1/0)  [place]     Last office visit: 4/2/25  Due back to clinic per last office note:  no mention  Date next office visit scheduled:    Future Appointments   Date Time Provider Department Center   5/16/2025 10:20 AM [id number]   9/2/2025  8:00 AM Kae Joshi MD [facility]   10/30/2025  2:00 PM Rojas Hutson MD [facility] [place]           Last OV note recommendation:    Problem/s Identified this visit:   1. MS (multiple sclerosis) (HCC)    2. Transverse myelitis (HCC)    3. Spasticity    4. Migraine without aura and without status migrainosus, not intractable    5. Central serous chorioretinopathy of both eyes             Discussion plus Diagnostics & Treatment Orders:  Try AJOVY instead of Nurtec and if not better after 3 months switch to trying Botox for the headache     For the leg spasticity increase Baclofen to 10 mg TID and refer to Dr Morse of Physiatry for assistance in dealing with the therapy needs and other adjunctive treatment for the leg spasticity     Bladder, try Vesicare     Keep on Kesimpta but get biomarkers to assess disease activity         Increase Meloxicam to 15 mg daily prn for the joint pains      Procedures    MISCELLANEOUS LAB TESTING [[facility] ONLY]    MISCELLANEOUS LAB TESTING [[facility] ONLY]    Immunoglobulin free LT chains blood    Sed Rate, Westergren (Automated)    Rheumatoid Arthritis Factor    Physiatry Referral - In Network            Orders Placed This Encounter    MISCELLANEOUS LAB TESTING [[facility] ONLY]       Glial fibrillary Acidic Protein       Standing Status:   Future       Standing Expiration Date:   4/2/2026       Order Specific Question:   Release to patient       Answer:   Immediate    MISCELLANEOUS LAB TESTING [[facility] ONLY]       Neurofilament light chain       Standing Status:   Future        Standing Expiration Date:   4/2/2026       Order Specific Question:   Release to patient       Answer:   Immediate    Immunoglobulin free LT chains blood       Standing Status:   Future       Standing Expiration Date:   4/2/2026       Order Specific Question:   Release to patient       Answer:   Immediate    Sed Rate, Westergren (Automated)       Standing Status:   Future       Standing Expiration Date:   4/2/2026    Rheumatoid Arthritis Factor       Standing Status:   Future       Standing Expiration Date:   4/2/2026    DISCONTD: topiramate 25 MG Oral Tab       Sig: Take 1 tablet (25 mg total) by mouth daily.    baclofen 10 MG Oral Tab       Sig: Take 1 tablet (10 mg total) by mouth 3 (three) times daily.       Dispense:  270 tablet       Refill:  3    Solifenacin Succinate 10 MG Oral Tab       Sig: Take 1 tablet (10 mg total) by mouth daily.       Dispense:  30 tablet       Refill:  5    Meloxicam 15 MG Oral Tab       Sig: Take 1 tablet (15 mg total) by mouth daily.       Dispense:  30 tablet       Refill:  5    Fremanezumab-vfrm (AJOVY) 225 MG/1.5ML Subcutaneous Solution Auto-injector       Sig: Inject 225 mg into the skin every 30 (thirty) days.       Dispense:  1 each       Refill:  0    Tadalafil (CIALIS) 20 MG Oral Tab       Sig: Take 1 tablet (20 mg total) by mouth daily as needed for Erectile Dysfunction.       Dispense:  4 tablet       Refill:  11    Fremanezumab-vfrm (AJOVY) 225 MG/1.5ML Subcutaneous Solution Auto-injector       Sig: Inject 225 mg into the skin every 30 (thirty) days.       Dispense:  1 each       Refill:  0       Order Specific Question:   Lot Number?       Answer:   [id number]       Order Specific Question:   Expiration Date?       Answer:   6/30/2025

## 2025-05-16 ENCOUNTER — OFFICE VISIT (OUTPATIENT)
Dept: PHYSICAL MEDICINE AND REHAB | Facility: CLINIC | Age: 45
End: 2025-05-16
Payer: COMMERCIAL

## 2025-05-16 ENCOUNTER — HOSPITAL ENCOUNTER (OUTPATIENT)
Dept: GENERAL RADIOLOGY | Age: 45
Discharge: HOME | End: 2025-05-16
Attending: PHYSICAL MEDICINE & REHABILITATION
Payer: COMMERCIAL

## 2025-05-16 VITALS — BODY MASS INDEX: 26.37 KG/M2 | HEIGHT: 67 IN | WEIGHT: 168 LBS

## 2025-05-16 DIAGNOSIS — G89.29 CHRONIC PAIN OF LEFT KNEE: ICD-10-CM

## 2025-05-16 DIAGNOSIS — R29.898 WEAKNESS OF RIGHT FOOT: ICD-10-CM

## 2025-05-16 DIAGNOSIS — G35 MULTIPLE SCLEROSIS (HCC): Primary | ICD-10-CM

## 2025-05-16 DIAGNOSIS — M25.562 CHRONIC PAIN OF LEFT KNEE: ICD-10-CM

## 2025-05-16 DIAGNOSIS — M21.372 LEFT FOOT DROP: ICD-10-CM

## 2025-05-16 PROCEDURE — 3008F BODY MASS INDEX DOCD: CPT | Performed by: PHYSICAL MEDICINE & REHABILITATION

## 2025-05-16 PROCEDURE — 73562 X-RAY EXAM OF KNEE 3: CPT | Performed by: PHYSICAL MEDICINE & REHABILITATION

## 2025-05-16 PROCEDURE — 99204 OFFICE O/P NEW MOD 45 MIN: CPT | Performed by: PHYSICAL MEDICINE & REHABILITATION

## 2025-05-16 NOTE — PATIENT INSTRUCTIONS
Increase the baclofen as follows:   10-10-20 x1 week, then   10-20-20 x1 week, then   20-20-20.     If you have side effects go back to your previous dose.

## 2025-05-16 NOTE — H&P
The following individual(s) verbally consented to be recorded using ambient AI listening technology and understand that they can each withdraw their consent to this listening technology at any point by asking the clinician to turn off or pause the recording:    Patient name: Yash Alarcon       History and Physical    C/C:   Chief Complaint   Patient presents with    New Patient     New patient is here with complaints of left knee pain that started 1 months ago. Reports to be numb from the waist down. Takes baclofen and meloxicam . No injections or imaging has been completed.  Pain 2/10        History of Present Illness  Yash Alarcon is a 45 year old male with multiple sclerosis who presents with left knee pain. He was referred by Dr. Hutson for evaluation of left knee pain, consideration of bracing and guidance in treatment of spasticity.    He has experienced left knee pain that has been worsening over the past month, and worsened by prolonged walking. The pain is present even when sitting and has been intermittent over the past three years, with relief upon resting. He senses swelling, though none is visible. He has used a brace on his right leg for two to three years, and the current AFO he has does not feel supportive enough. Meloxicam and nabumetone have not alleviated the knee pain. He has no previous history of left knee disorders, no imaging.     Weakness began in the [facility] around 2001; he was told by a covering doctor who reviewed his imaging that he had multiple sclerosis, and then Transverse Myelitis by the doctor who ordered the imaging. He has undergone multiple imaging and per notes appears to have come to a diagnosis of MS, for which he is currently on Kesimpta. He has had LE weakness and numbness from the waist down since then. He uses a walker or Lofstrand crutches for mobility and has an AFO on his left leg. A right leg brace was prescribed in 2021 but not obtained due to  insurance issues and high cost of the brace. He has not received steroid treatments in the past two years due to a condition in his left eye that worsens when he is given steroids either by mouth or by IV.     He experiences spasticity and takes baclofen 10 mg three times daily. He reports increased fatigue in recent months and last attended physical therapy in 2021. He has not had Botox injections for spasticity.     Past Medical History[1]     Pertinent allergies: Allergies[2]     Physical exam:  Ht 67\"   Wt 168 lb (76.2 kg)   BMI 26.31 kg/m²      He ambulates with the use of a left AFO and bilateral Lofstrand crutches. The left leg hyperextends during the gait cycle, and he circumducts the left during swing phase  3/4 BLE reflexes  Poor sensation to LT in the bilateral lower extremities. He has some diminished sensation to LT in the right medial lower leg, but no sensation to LT in the lateral legs  He displays 3-4 beats of myoclonus in both lower extremities    left knee exam  Observation: No appreciable effusion on inspection. Informal bedside US also does not show any appreciable effusion    Range of motion:  Flexion: 120 degrees  Extension: 0 degrees    Palpation:  Medial joint line tenderness: - (no sensation to LT)  Lateral joint line tenderness: - (no sensation to LT)  Medial patellar facet tenderness: - (no sensation to LT)  Lateral patellar facet tenderness: - (no sensation to LT)    Provocative tests:  Lachman: -    IMAGING: no imaging of the knee to review    MRI cervical spine w/wo contrast 3/25/2025:  FINDINGS:     Cervical cord is normal in caliber. There is no focal enlargement or atrophy. There is no discrete increased signal in the cervical cord. On the postcontrast T1-weighted sagittal and axial sequences, there is no evidence of abnormal enhancement in the   cervical cord.     Visualized portion of the upper thoracic cord brainstem and the cerebral hemispheres is normal in signal intensity  with no abnormal enhancement.     There is straightening of the curvature in the upper cervical spine. Bone marrow signal intensity is normal. There is no evidence of bone marrow edema. There is no evidence of compression fracture deformities.     Disc spaces are well-maintained, but there is diffuse loss of signal within the disks.     C2-C3: Unremarkable     C3-C4: Small disc bulge contacts the cord anteriorly causing mild flattening. Spinal canal measures 10 mm AP dimension. There is no evidence of neural foraminal compromise.     C4-C5: Small diffuse disc bulge contacts the cord causing mild flattening. Spinal canal measures 10 mm AP dimension. There is no evidence of neural foraminal compromise.     C5-C6: Minimal diffuse disc bulge does not result in spinal stenosis. There is no evidence of neural foraminal compromise.     C6-C7: Minimal disc bulge identified. There is no evidence of spinal stenosis or neural foraminal compromise.     C7-T1: Unremarkable.     IMPRESSION:     Cervical cord, and the visualized portions of the brainstem, cerebellar hemispheres, and the upper thoracic cord are normal in signal intensity with no evidence of abnormal enhancement.     Small diffuse disc bulges cause borderline spinal stenosis at C3-C4 and C4-C5 levels.     Overall, there are no significant interval changes.     MRI brain w/wo contrast 11/25/2024:  FINDINGS: Multiple sub-5 mm T2/FLAIR hyperintense signal abnormalities are redemonstrated in the supratentorial white matter bilaterally (images 92, 95, 98, axial FLAIR series 30 on the right side; images 95, 96, 104, axial FLAIR series 30 on the left   side). These findings are unchanged compared to the prior MRI. These white matter signal abnormalities do not enhance on postcontrast imaging. The corpus callosum is normal in morphology and signal. The cerebellum and brainstem are normal in morphology   and signal. No areas of restricted diffusion are identified. No  evidence of edema, cortical infarct, hemorrhage, intracranial mass, abnormal intracranial enhancement, or abnormal extra-axial fluid collection. Ventricular volumes are normal. No midline   shift. The major intracranial arterial flow voids and the dural venous sinuses appear patent. Normal size of the pituitary gland. No Chiari malformation.     Mucous retention cysts are identified in bilateral maxillary sinuses, larger on the left side than right. Mild mucosal thickening in bilateral ethmoid air cells. Leftward nasal septal deviation. The mastoid air cells are normally aerated.     IMPRESSION:   1. Multiple sub-5 mm T2/FLAIR hyperintense, non-enhancing signal abnormalities in the supratentorial white matter bilaterally are unchanged compared to the prior MRI. Though non-specific in appearance, these white matter signal abnormalities are   compatible with the history of multiple sclerosis. No new or acute intracranial abnormalities are identified.     2. Redemonstrated paranasal sinus inflammatory changes which remain most pronounced in the maxillary sinuses.     Assessment & Plan  Multiple Sclerosis    Weakness began in 2021 and has been present since. Currently on Kesimpta by neurologist Dr. Hutson.     Left knee Pain    He has intermittent left knee pain exacerbated by walking, with no swelling or injury history. Current pain management is ineffective. Order x-ray and MRI of the left knee. Avoid steroids by mouth and injection due to side effects to steroids in the past.     Spasticity    Spasticity has worsened over time. Current treatment includes baclofen, with potential Botox injections discussed. Increase baclofen dosage by 10 mg weekly, up to 20 mg three times daily. Order physical therapy for gait/balance training, spasticity management    Numbness from waist down    Numbness has been present since 2001, associated with MS diagnosis.    Follow-up    Plans include imaging and therapy follow-up. Arrange  for new brace fitting. Coordinate with assistant to find a suitable physical therapy location.     Recording duration: 30 minutes    Rojas Morse DO  Physical Medicine and Rehabilitation  [facility]       [1]   Past Medical History:   Migraines    Multiple sclerosis (HCC)    Tremor   [2] No Known Allergies

## 2025-05-27 RX ORDER — RIMEGEPANT SULFATE 75 MG/75MG
1 TABLET, ORALLY DISINTEGRATING ORAL EVERY OTHER DAY
Qty: 16 TABLET | Refills: 11 | Status: SHIPPED | OUTPATIENT
Start: 2025-05-27

## 2025-05-27 NOTE — TELEPHONE ENCOUNTER
Medication: Nurtec 75 mg     Date of last refill:12/02/2024 # 16/5  [place]     Last office visit: 4/2/25  Due back to clinic per last office note:  no mention  Date next office visit scheduled:           Future Appointments   Date Time Provider Department Center   5/16/2025 10:20 AM [facility]   9/2/2025  8:00 AM Kae Joshi MD [facility]   10/30/2025  2:00 PM Rojas Hutson MD [facility] [place]            Last OV note recommendation:     Problem/s Identified this visit:   1. MS (multiple sclerosis) (HCC)    2. Transverse myelitis (HCC)    3. Spasticity    4. Migraine without aura and without status migrainosus, not intractable    5. Central serous chorioretinopathy of both eyes             Discussion plus Diagnostics & Treatment Orders:  Try AJOVY instead of Nurtec and if not better after 3 months switch to trying Botox for the headache     For the leg spasticity increase Baclofen to 10 mg TID and refer to Dr Morse of Physiatry for assistance in dealing with the therapy needs and other adjunctive treatment for the leg spasticity     Bladder, try Vesicare     Keep on Kesimpta but get biomarkers to assess disease activity

## 2025-06-04 ENCOUNTER — TELEPHONE (OUTPATIENT)
Dept: NEUROLOGY | Facility: CLINIC | Age: 45
End: 2025-06-04

## 2025-06-04 NOTE — TELEPHONE ENCOUNTER
PA requested for [facility]  Clinical questions answered and submitted to insurance  Awaiting coverage determination

## 2025-06-05 NOTE — TELEPHONE ENCOUNTER
Received fax from [email or web address]   Approval received for patient use of Nurtec   Approval granted:  June 4, 2025 to June 4, 2027         Pt notified

## 2025-06-30 DIAGNOSIS — G37.3 TRANSVERSE MYELITIS (HCC): ICD-10-CM

## 2025-06-30 DIAGNOSIS — R25.2 SPASTICITY: ICD-10-CM

## 2025-06-30 DIAGNOSIS — G43.009 MIGRAINE WITHOUT AURA AND WITHOUT STATUS MIGRAINOSUS, NOT INTRACTABLE: ICD-10-CM

## 2025-06-30 DIAGNOSIS — G35 MS (MULTIPLE SCLEROSIS) (HCC): ICD-10-CM

## 2025-06-30 NOTE — TELEPHONE ENCOUNTER
Medication: Ajovy 225mg     Date of last refill:04/30/25     [place]     Last office visit: 4/2/25  Due back to clinic per last office note:  no mention  Date next office visit scheduled:                Future Appointments   Date Time Provider Department Center   5/16/2025 10:20 AM [facility]   9/2/2025  8:00 AM Kae Joshi MD [facility] 17 [facility]   10/30/2025  2:00 PM Rojas Hutson MD ENIWARREN [facility] [place]            Last OV note recommendation:     Problem/s Identified this visit:   1. MS (multiple sclerosis) (HCC)    2. Transverse myelitis (HCC)    3. Spasticity    4. Migraine without aura and without status migrainosus, not intractable    5. Central serous chorioretinopathy of both eyes             Discussion plus Diagnostics & Treatment Orders:  Try AJOVY instead of Nurtec and if not better after 3 months switch to trying Botox for the headache     For the leg spasticity increase Baclofen to 10 mg TID and refer to Dr Morse of Physiatry for assistance in dealing with the therapy needs and other adjunctive treatment for the leg spasticity     Bladder, try Vesicare     Keep on Kesimpta but get biomarkers to assess disease activity

## 2025-07-01 ENCOUNTER — HOSPITAL ENCOUNTER (OUTPATIENT)
Dept: MRI IMAGING | Age: 45
Discharge: HOME | End: 2025-07-01
Attending: PHYSICAL MEDICINE & REHABILITATION
Payer: COMMERCIAL

## 2025-07-01 DIAGNOSIS — M25.562 CHRONIC PAIN OF LEFT KNEE: ICD-10-CM

## 2025-07-01 DIAGNOSIS — G89.29 CHRONIC PAIN OF LEFT KNEE: ICD-10-CM

## 2025-07-01 PROCEDURE — 73721 MRI JNT OF LWR EXTRE W/O DYE: CPT | Performed by: PHYSICAL MEDICINE & REHABILITATION

## 2025-07-01 RX ORDER — FREMANEZUMAB-VFRM 225 MG/1.5ML
1.5 INJECTION SUBCUTANEOUS
Qty: 1 EACH | Refills: 5 | Status: SHIPPED | OUTPATIENT
Start: 2025-07-01

## (undated) DIAGNOSIS — R76.12 POSITIVE QUANTIFERON-TB GOLD TEST: Primary | ICD-10-CM

## (undated) DIAGNOSIS — G35 MS (MULTIPLE SCLEROSIS) (HCC): ICD-10-CM

## (undated) DIAGNOSIS — G35 MS (MULTIPLE SCLEROSIS) (HCC): Primary | ICD-10-CM

## (undated) NOTE — LETTER
11/2/2022      Marcus Alarcon  2/2/1980      To Whom It May Concern,      Treatment with IVIG Gammagard has been recommended and ordered by my office for the treatment of [address] Relapsing Remitting Multiple Sclerosis exacerbation. This medication was ordered as treatment for his exacerbation since he is unable to be treated with Solumedrol infusions due to the medical condition, central serous retinopathy. Steroids are contraindicated for Mr. Merced Martin.     The use of Interferon treatment is also not appropriate. Interferon is used as a disease modifying agent and not treatment for a MS flare. These symptoms may remain permanently if not treated in an appropriate and timely fashion. Please approve treatment for my patient at this time. This is a short term treatment option.         Sincerely,      Mandy Meyers MD  Vascular and General Neurology  [facility]

## (undated) NOTE — LETTER
2024      Regarding: Yash Alarcon,  1980     To Whom It May Concern,        Treatment with IVIG Gammagard has been recommended and ordered by my office for the treatment of Yash Alarcon's Relapsing Remitting Multiple Sclerosis exacerbation.      This medication was ordered as treatment for his exacerbation since he is unable to be treated with Solumedrol infusions due to the medical condition, central serous retinopathy. Steroids are contraindicated for Mr. Alarcon.      The use of Interferon treatment is also not appropriate. Interferon is used as a disease modifying agent and not treatment for a MS flare.     These symptoms may remain permanently if not treated in an appropriate and timely fashion.     Please approve treatment for my patient at this time. This is a short term treatment option.        Sincerely,            Rojas Hutson MD  Vascular and General Neurology  [facility]